# Patient Record
Sex: FEMALE | Race: WHITE | Employment: OTHER | URBAN - METROPOLITAN AREA
[De-identification: names, ages, dates, MRNs, and addresses within clinical notes are randomized per-mention and may not be internally consistent; named-entity substitution may affect disease eponyms.]

---

## 2017-01-03 ENCOUNTER — HOSPITAL ENCOUNTER (OUTPATIENT)
Dept: MAMMOGRAPHY | Facility: CLINIC | Age: 57
Discharge: HOME | End: 2017-01-03
Payer: COMMERCIAL

## 2017-01-03 DIAGNOSIS — Z12.31 OTHER SCREENING MAMMOGRAM: ICD-10-CM

## 2017-01-03 PROCEDURE — G0202 SCR MAMMO BI INCL CAD: HCPCS

## 2017-01-04 ENCOUNTER — ALLSCRIPTS OFFICE VISIT (OUTPATIENT)
Dept: OTHER | Facility: OTHER | Age: 57
End: 2017-01-04

## 2017-04-04 ENCOUNTER — ALLSCRIPTS OFFICE VISIT (OUTPATIENT)
Dept: OTHER | Facility: OTHER | Age: 57
End: 2017-04-04

## 2017-04-05 DIAGNOSIS — F41.1 GENERALIZED ANXIETY DISORDER: ICD-10-CM

## 2017-04-05 DIAGNOSIS — Z78.9 OTHER SPECIFIED HEALTH STATUS: ICD-10-CM

## 2017-04-05 DIAGNOSIS — E78.5 HYPERLIPIDEMIA: ICD-10-CM

## 2017-04-05 DIAGNOSIS — Z78.0 ASYMPTOMATIC MENOPAUSAL STATE: ICD-10-CM

## 2017-04-13 ENCOUNTER — APPOINTMENT (OUTPATIENT)
Dept: LAB | Facility: CLINIC | Age: 57
End: 2017-04-13
Payer: COMMERCIAL

## 2017-04-13 DIAGNOSIS — F41.1 GENERALIZED ANXIETY DISORDER: ICD-10-CM

## 2017-04-13 DIAGNOSIS — Z78.0 ASYMPTOMATIC MENOPAUSAL STATE: ICD-10-CM

## 2017-04-13 DIAGNOSIS — Z78.9 OTHER SPECIFIED HEALTH STATUS: ICD-10-CM

## 2017-04-13 DIAGNOSIS — E78.5 HYPERLIPIDEMIA: ICD-10-CM

## 2017-04-13 LAB
25(OH)D3 SERPL-MCNC: 20.6 NG/ML (ref 30–100)
ALBUMIN SERPL BCP-MCNC: 3.8 G/DL (ref 3.5–5)
ALP SERPL-CCNC: 98 U/L (ref 46–116)
ALT SERPL W P-5'-P-CCNC: 25 U/L (ref 12–78)
ANION GAP SERPL CALCULATED.3IONS-SCNC: 5 MMOL/L (ref 4–13)
AST SERPL W P-5'-P-CCNC: 24 U/L (ref 5–45)
BACTERIA UR QL AUTO: NORMAL /HPF
BASOPHILS # BLD AUTO: 0.03 THOUSANDS/ΜL (ref 0–0.1)
BASOPHILS NFR BLD AUTO: 1 % (ref 0–1)
BILIRUB SERPL-MCNC: 0.32 MG/DL (ref 0.2–1)
BILIRUB UR QL STRIP: NEGATIVE
BUN SERPL-MCNC: 16 MG/DL (ref 5–25)
CALCIUM SERPL-MCNC: 9.5 MG/DL (ref 8.3–10.1)
CHLORIDE SERPL-SCNC: 106 MMOL/L (ref 100–108)
CHOLEST SERPL-MCNC: 204 MG/DL (ref 50–200)
CLARITY UR: ABNORMAL
CO2 SERPL-SCNC: 29 MMOL/L (ref 21–32)
COLOR UR: YELLOW
CREAT SERPL-MCNC: 0.67 MG/DL (ref 0.6–1.3)
EOSINOPHIL # BLD AUTO: 0.09 THOUSAND/ΜL (ref 0–0.61)
EOSINOPHIL NFR BLD AUTO: 2 % (ref 0–6)
ERYTHROCYTE [DISTWIDTH] IN BLOOD BY AUTOMATED COUNT: 13.8 % (ref 11.6–15.1)
GFR SERPL CREATININE-BSD FRML MDRD: >60 ML/MIN/1.73SQ M
GLUCOSE P FAST SERPL-MCNC: 85 MG/DL (ref 65–99)
GLUCOSE UR STRIP-MCNC: NEGATIVE MG/DL
HCT VFR BLD AUTO: 44.6 % (ref 34.8–46.1)
HDLC SERPL-MCNC: 146 MG/DL (ref 40–60)
HGB BLD-MCNC: 14.6 G/DL (ref 11.5–15.4)
HGB UR QL STRIP.AUTO: ABNORMAL
HYALINE CASTS #/AREA URNS LPF: NORMAL /LPF
KETONES UR STRIP-MCNC: NEGATIVE MG/DL
LDLC SERPL CALC-MCNC: 49 MG/DL (ref 0–100)
LDLC SERPL DIRECT ASSAY-MCNC: 35 MG/DL (ref 0–100)
LEUKOCYTE ESTERASE UR QL STRIP: ABNORMAL
LYMPHOCYTES # BLD AUTO: 2.26 THOUSANDS/ΜL (ref 0.6–4.47)
LYMPHOCYTES NFR BLD AUTO: 43 % (ref 14–44)
MCH RBC QN AUTO: 32.2 PG (ref 26.8–34.3)
MCHC RBC AUTO-ENTMCNC: 32.7 G/DL (ref 31.4–37.4)
MCV RBC AUTO: 98 FL (ref 82–98)
MONOCYTES # BLD AUTO: 0.46 THOUSAND/ΜL (ref 0.17–1.22)
MONOCYTES NFR BLD AUTO: 9 % (ref 4–12)
NEUTROPHILS # BLD AUTO: 2.39 THOUSANDS/ΜL (ref 1.85–7.62)
NEUTS SEG NFR BLD AUTO: 45 % (ref 43–75)
NITRITE UR QL STRIP: NEGATIVE
NON-SQ EPI CELLS URNS QL MICRO: NORMAL /HPF
NRBC BLD AUTO-RTO: 0 /100 WBCS
PH UR STRIP.AUTO: 6.5 [PH] (ref 4.5–8)
PLATELET # BLD AUTO: 369 THOUSANDS/UL (ref 149–390)
PMV BLD AUTO: 10.2 FL (ref 8.9–12.7)
POTASSIUM SERPL-SCNC: 4.9 MMOL/L (ref 3.5–5.3)
PROT SERPL-MCNC: 7.5 G/DL (ref 6.4–8.2)
PROT UR STRIP-MCNC: NEGATIVE MG/DL
RBC # BLD AUTO: 4.54 MILLION/UL (ref 3.81–5.12)
RBC #/AREA URNS AUTO: NORMAL /HPF
SODIUM SERPL-SCNC: 140 MMOL/L (ref 136–145)
SP GR UR STRIP.AUTO: 1.01 (ref 1–1.03)
TRIGL SERPL-MCNC: 45 MG/DL
TSH SERPL DL<=0.05 MIU/L-ACNC: 1.29 UIU/ML (ref 0.36–3.74)
UROBILINOGEN UR QL STRIP.AUTO: 0.2 E.U./DL
VIT B12 SERPL-MCNC: 996 PG/ML (ref 100–900)
WBC # BLD AUTO: 5.24 THOUSAND/UL (ref 4.31–10.16)
WBC #/AREA URNS AUTO: NORMAL /HPF

## 2017-04-13 PROCEDURE — 80053 COMPREHEN METABOLIC PANEL: CPT

## 2017-04-13 PROCEDURE — 85025 COMPLETE CBC W/AUTO DIFF WBC: CPT

## 2017-04-13 PROCEDURE — 82607 VITAMIN B-12: CPT

## 2017-04-13 PROCEDURE — 83721 ASSAY OF BLOOD LIPOPROTEIN: CPT

## 2017-04-13 PROCEDURE — 36415 COLL VENOUS BLD VENIPUNCTURE: CPT

## 2017-04-13 PROCEDURE — 87086 URINE CULTURE/COLONY COUNT: CPT

## 2017-04-13 PROCEDURE — 80061 LIPID PANEL: CPT

## 2017-04-13 PROCEDURE — 84590 ASSAY OF VITAMIN A: CPT

## 2017-04-13 PROCEDURE — 84443 ASSAY THYROID STIM HORMONE: CPT

## 2017-04-13 PROCEDURE — 81001 URINALYSIS AUTO W/SCOPE: CPT

## 2017-04-13 PROCEDURE — 84425 ASSAY OF VITAMIN B-1: CPT

## 2017-04-13 PROCEDURE — 82306 VITAMIN D 25 HYDROXY: CPT

## 2017-04-14 LAB — BACTERIA UR CULT: NORMAL

## 2017-04-17 ENCOUNTER — GENERIC CONVERSION - ENCOUNTER (OUTPATIENT)
Dept: OTHER | Facility: OTHER | Age: 57
End: 2017-04-17

## 2017-04-17 LAB
VIT A SERPL-MCNC: 78 UG/DL (ref 20–65)
VIT B1 BLD-SCNC: 132.1 NMOL/L (ref 66.5–200)

## 2018-01-09 NOTE — MISCELLANEOUS
Message   Recorded as Task   Date: 04/17/2017 11:50 AM, Created By: Stephanie Roman   Task Name: Call Back   Assigned To: Boni Quintana   Regarding Patient: Kings Ortiz, Status: Active   Comment:    Stephanie Roman - 17 Apr 2017 11:50 AM     TASK CREATED  Caller: Self; Results Inquiry; [phone] (Home); [phone] (Mobile Phone)  wants results to her labs completed last week      cell phone [phone] - 18 Apr 2017 8:25 AM     TASK EDITED  I spoke with the patient on 04/17/2017 and reviewed her testing  All of her labs were normal except for some mild vitamin D insufficiency  I advised her to start taking over-the-counter vitamin D3 2000 units daily  We will see her back as scheduled  Plan  Vitamin D insufficiency    · Vitamin D3 2000 UNIT Oral Capsule; TAKE 1 CAPSULE BY MOUTH DAILY    Signatures   Electronically signed by : Sunny Lacy DO;  Apr 18 2017  8:27AM EST                       (Author)

## 2018-01-13 VITALS
HEART RATE: 72 BPM | BODY MASS INDEX: 22.48 KG/M2 | TEMPERATURE: 98.3 F | DIASTOLIC BLOOD PRESSURE: 80 MMHG | HEIGHT: 70 IN | SYSTOLIC BLOOD PRESSURE: 122 MMHG | WEIGHT: 157 LBS

## 2018-01-15 VITALS
HEIGHT: 70 IN | DIASTOLIC BLOOD PRESSURE: 84 MMHG | SYSTOLIC BLOOD PRESSURE: 132 MMHG | WEIGHT: 158 LBS | BODY MASS INDEX: 22.62 KG/M2 | HEART RATE: 72 BPM

## 2018-03-06 ENCOUNTER — OFFICE VISIT (OUTPATIENT)
Dept: FAMILY MEDICINE CLINIC | Facility: CLINIC | Age: 58
End: 2018-03-06
Payer: COMMERCIAL

## 2018-03-06 VITALS
SYSTOLIC BLOOD PRESSURE: 120 MMHG | BODY MASS INDEX: 22.9 KG/M2 | OXYGEN SATURATION: 98 % | HEIGHT: 70 IN | WEIGHT: 160 LBS | HEART RATE: 65 BPM | DIASTOLIC BLOOD PRESSURE: 70 MMHG

## 2018-03-06 DIAGNOSIS — H61.21 IMPACTED CERUMEN OF RIGHT EAR: Primary | ICD-10-CM

## 2018-03-06 PROBLEM — E55.9 VITAMIN D INSUFFICIENCY: Status: ACTIVE | Noted: 2017-04-18

## 2018-03-06 PROBLEM — F41.1 ANXIETY, GENERALIZED: Status: ACTIVE | Noted: 2017-04-04

## 2018-03-06 PROCEDURE — 69209 REMOVE IMPACTED EAR WAX UNI: CPT | Performed by: NURSE PRACTITIONER

## 2018-03-06 PROCEDURE — 99213 OFFICE O/P EST LOW 20 MIN: CPT | Performed by: NURSE PRACTITIONER

## 2018-03-06 RX ORDER — ACETAMINOPHEN 160 MG
1 TABLET,DISINTEGRATING ORAL DAILY
COMMUNITY
Start: 2017-04-18 | End: 2018-09-17

## 2018-03-06 NOTE — PROGRESS NOTES
Assessment/Plan   Diagnoses and all orders for this visit:    Impacted cerumen of right ear  -     neomycin-polymyxin-hydrocortisone (CORTISPORIN) otic solution; Administer 4 drops into both ears every 6 (six) hours    Other orders  -     Cholecalciferol (VITAMIN D3) 2000 units capsule; Take 1 capsule by mouth daily        Chief Complaint   Patient presents with    Ear ringing     in both ears for 2 weeks       Subjective   Patient ID: Mili Gupta is a 62 y o  female  Vitals:    03/06/18 1157   BP: 120/70   Pulse: 65   SpO2: 98%     Ear Fullness    There is pain in both ears  This is a new problem  The current episode started 1 to 4 weeks ago (3-4 weeks ago noticed ringing, "chirping" in my ears")  The problem occurs constantly  The problem has been unchanged  There has been no fever  The pain is at a severity of 0/10  The patient is experiencing no pain  Pertinent negatives include no abdominal pain, coughing, diarrhea, ear discharge, headaches, hearing loss, neck pain, rash, rhinorrhea, sore throat or vomiting  She has tried nothing for the symptoms  The treatment provided no relief  There is no history of a chronic ear infection, hearing loss or a tympanostomy tube  The following portions of the patient's history were reviewed and updated as appropriate: allergies, current medications, past family history, past medical history, past surgical history and problem list     Review of Systems   Constitutional: Negative  HENT: Positive for tinnitus  Negative for ear discharge, hearing loss, rhinorrhea and sore throat  Eyes: Negative  Respiratory: Negative  Negative for cough  Cardiovascular: Negative  Gastrointestinal: Negative  Negative for abdominal pain, diarrhea and vomiting  Endocrine: Negative  Genitourinary: Negative  Musculoskeletal: Negative  Negative for neck pain  Skin: Negative for rash  Neurological: Negative  Negative for headaches  Hematological: Negative  Psychiatric/Behavioral: Negative  Objective     Physical Exam   Constitutional: She is oriented to person, place, and time  She appears well-nourished  No distress  HENT:   Head: Normocephalic  Right Ear: A foreign body (hard, black impacted cerumen in bilateralears  ) is present  No mastoid tenderness  Left Ear: A foreign body is present  No mastoid tenderness  Tympanic membrane is not injected, not erythematous and not bulging  Neurological: She is alert and oriented to person, place, and time  Skin: Skin is warm and dry  She is not diaphoretic  Psychiatric: She has a normal mood and affect  Her behavior is normal  Judgment and thought content normal    Procedure: Left ear cerumen removed with lighted curet without difficulty although cerumen was hard and scratched the lower ear canal on removal  States the "chiping had stopped" in her left ear  The right ear however was increased more difficult, irrigation was attempted, lighted curet removal was attempted and unable to remove without causing discomfort for the patient  Plan:  To use ear drops in both ears,  Left ear for irritation in the ear post obstruction, and the right ear to soften the cerumen for removal  Will return in 5-6 days for another attempt of removal

## 2018-03-12 ENCOUNTER — OFFICE VISIT (OUTPATIENT)
Dept: FAMILY MEDICINE CLINIC | Facility: CLINIC | Age: 58
End: 2018-03-12
Payer: COMMERCIAL

## 2018-03-12 VITALS
HEIGHT: 70 IN | HEART RATE: 78 BPM | SYSTOLIC BLOOD PRESSURE: 126 MMHG | DIASTOLIC BLOOD PRESSURE: 70 MMHG | WEIGHT: 161 LBS | BODY MASS INDEX: 23.05 KG/M2

## 2018-03-12 DIAGNOSIS — H61.21 IMPACTED CERUMEN OF RIGHT EAR: ICD-10-CM

## 2018-03-12 PROCEDURE — 69209 REMOVE IMPACTED EAR WAX UNI: CPT | Performed by: NURSE PRACTITIONER

## 2018-03-12 PROCEDURE — 99212 OFFICE O/P EST SF 10 MIN: CPT | Performed by: NURSE PRACTITIONER

## 2018-03-12 NOTE — PROGRESS NOTES
Assessment/Plan   Diagnoses and all orders for this visit:    Impacted cerumen of right ear  -     neomycin-polymyxin-hydrocortisone (CORTISPORIN) otic solution; Administer 4 drops into both ears every 6 (six) hours        Chief Complaint   Patient presents with    Earache     Followup from ear lavage - her left ear is feeling fine but her right ear continues to "buzz"  Subjective   Patient ID: Harry Decker is a 62 y o  female  Vitals:    03/12/18 0928   BP: 126/70   Pulse: 78     Earache    There is pain in the right (impacted cerumen in right canal, has been using mineral oil and otic ear drops to soften) ear  This is a recurrent problem  The current episode started more than 1 month ago  Episode frequency: "not really pain" "chirping in my right ear" , left ear chirping has resolved post removal of wax last week  The problem has been unchanged  There has been no fever  The pain is at a severity of 0/10  The patient is experiencing no pain  Associated symptoms include ear discharge (using ear drops) and hearing loss  Pertinent negatives include no abdominal pain, coughing, diarrhea, headaches, neck pain, rash, rhinorrhea, sore throat or vomiting  She has tried antibiotics and ear drops for the symptoms  The treatment provided no relief  There is no history of a chronic ear infection, hearing loss or a tympanostomy tube  The following portions of the patient's history were reviewed and updated as appropriate: allergies, current medications, past medical history, past social history, past surgical history and problem list     Review of Systems   Constitutional: Negative  HENT: Positive for ear discharge (using ear drops), ear pain and hearing loss  Negative for rhinorrhea and sore throat  Eyes: Negative  Respiratory: Negative  Negative for cough  Cardiovascular: Negative  Gastrointestinal: Negative  Negative for abdominal pain, diarrhea and vomiting  Endocrine: Negative  Genitourinary: Negative  Musculoskeletal: Negative  Negative for neck pain  Skin: Negative  Negative for rash  Allergic/Immunologic: Negative  Neurological: Negative  Negative for headaches  Hematological: Negative  Psychiatric/Behavioral: Negative  Objective     Physical Exam   Constitutional: She appears well-nourished  No distress  HENT:   Head: Normocephalic  Right Ear: External ear normal  No tenderness  A foreign body (cerumen remains impacted) is present  No mastoid tenderness  Tympanic membrane is not erythematous (post removal, ear canal with redness and slight swelling)  No middle ear effusion  Left Ear: Hearing, tympanic membrane, external ear and ear canal normal  No tenderness  No mastoid tenderness  No middle ear effusion  Mouth/Throat: Uvula is midline, oropharynx is clear and moist and mucous membranes are normal    Procedure: Right ear canal initially blocked with cerumen, irrigated with warm water and cerumen was removed without difficulty  Tolerated the procedure without pain or discomfort   Denies dizziness

## 2018-03-12 NOTE — PATIENT INSTRUCTIONS
Otitis Externa   WHAT YOU NEED TO KNOW:   Otitis externa, or swimmer's ear, is an infection in the outer ear canal  This canal goes from the outside of the ear to the eardrum  DISCHARGE INSTRUCTIONS:   Return to the emergency department if:   · You have severe ear pain  · You are suddenly unable to hear at all  · You have new swelling in your face, behind your ears, or in your neck  · You suddenly cannot move part of your face  · Your face suddenly feels numb  Contact your healthcare provider if:   · You have a fever  · Your signs and symptoms do not get better after 2 days of treatment  · Your signs and symptoms go away for a time, but then come back  · You have questions or concerns about your condition or care  Medicines:   · NSAIDs , such as ibuprofen, help decrease swelling, pain, and fever  This medicine is available with or without a doctor's order  NSAIDs can cause stomach bleeding or kidney problems in certain people  If you take blood thinner medicine, always ask if NSAIDs are safe for you  Always read the medicine label and follow directions  Do not give these medicines to children under 10months of age without direction from your child's healthcare provider  · Acetaminophen  decreases pain and fever  It is available without a doctor's order  Ask how much to take and how often to take it  Follow directions  Acetaminophen can cause liver damage if not taken correctly  · Ear drops  that contain an antibiotic may be given  The antibiotic helps treat a bacterial infection  You may also be given steroid medicine  The steroid helps decrease redness, swelling, and pain  · Take your medicine as directed  Contact your healthcare provider if you think your medicine is not helping or if you have side effects  Tell him or her if you are allergic to any medicine  Keep a list of the medicines, vitamins, and herbs you take  Include the amounts, and when and why you take them  Bring the list or the pill bottles to follow-up visits  Carry your medicine list with you in case of an emergency  Follow up with your healthcare provider as directed:  Write down your questions so you remember to ask them during your visits  How to use eardrops:   · Lie down on your side with your infected ear facing up  · Carefully drip the correct number of eardrops into your ear  Have another person help you if possible  · Gently move the outside part of your ear back and forth to help the medicine reach your ear canal      · Stay lying down in the same position (with your ear facing up) for 3 to 5 minutes  Prevent otitis externa:   · Do not put cotton swabs or foreign objects in your ears  · Wrap a clean moist washcloth around your finger, and use it to clean your outer ear and remove extra ear wax  · Use ear plugs when you swim  Dry your outer ears completely after you swim or bathe  © 2017 [employer]  Information is for End User's use only and may not be sold, redistributed or otherwise used for commercial purposes  All illustrations and images included in CareNotes® are the copyrighted property of [employer]  or Garth Wilkinson  The above information is an  only  It is not intended as medical advice for individual conditions or treatments  Talk to your doctor, nurse or pharmacist before following any medical regimen to see if it is safe and effective for you

## 2018-04-11 ENCOUNTER — TRANSCRIBE ORDERS (OUTPATIENT)
Dept: ADMINISTRATIVE | Facility: HOSPITAL | Age: 58
End: 2018-04-11

## 2018-04-11 DIAGNOSIS — Z12.39 BREAST SCREENING: Primary | ICD-10-CM

## 2018-04-19 ENCOUNTER — HOSPITAL ENCOUNTER (OUTPATIENT)
Dept: MAMMOGRAPHY | Facility: CLINIC | Age: 58
Discharge: HOME | End: 2018-04-19
Payer: COMMERCIAL

## 2018-04-19 DIAGNOSIS — Z12.39 BREAST SCREENING: ICD-10-CM

## 2018-04-19 PROCEDURE — 77067 SCR MAMMO BI INCL CAD: CPT

## 2018-09-17 ENCOUNTER — OFFICE VISIT (OUTPATIENT)
Dept: FAMILY MEDICINE CLINIC | Facility: CLINIC | Age: 58
End: 2018-09-17
Payer: COMMERCIAL

## 2018-09-17 VITALS
TEMPERATURE: 97.4 F | RESPIRATION RATE: 13 BRPM | WEIGHT: 158 LBS | HEIGHT: 70 IN | BODY MASS INDEX: 22.62 KG/M2 | OXYGEN SATURATION: 96 % | DIASTOLIC BLOOD PRESSURE: 76 MMHG | HEART RATE: 70 BPM | SYSTOLIC BLOOD PRESSURE: 124 MMHG

## 2018-09-17 DIAGNOSIS — Z13.6 SCREENING FOR CARDIOVASCULAR CONDITION: ICD-10-CM

## 2018-09-17 DIAGNOSIS — Z00.00 HEALTH MAINTENANCE EXAMINATION: Primary | ICD-10-CM

## 2018-09-17 DIAGNOSIS — Z13.1 SCREENING FOR DIABETES MELLITUS: ICD-10-CM

## 2018-09-17 DIAGNOSIS — Z78.9 VEGETARIAN DIET: ICD-10-CM

## 2018-09-17 PROCEDURE — 99396 PREV VISIT EST AGE 40-64: CPT | Performed by: FAMILY MEDICINE

## 2018-09-17 NOTE — PATIENT INSTRUCTIONS
I would recommend the Tdap- tetanus vaccine, Flu Vaccine,  and check with insurance about Shingrix- shingles vaccine

## 2018-09-17 NOTE — PROGRESS NOTES
Chief Complaint   Patient presents with    Physical Exam     62years old       Subjective     Lino Lee is a 62 y o   female and is here for routine health maintenance  The patient reports fluttering in her chest and occasional chest tightness  Leanne Nazario History of Present Illness     Lino Lee is a 62 y o  female who presents today for complete physical  She has been feeling well and has no complaints today  The patient denies any chest pain, or shortness of breath  She is having some palpitations-fluttering in her chest on and off over the past month  There was a history of MVP in the past   There is no relation to activity usually at rest   There is no edema  There are no headaches or visual changes  There is no lightheadedness, dizziness, or fainting spells  It will last less than a minute  There is no chest pain with exertion  There are no GI symptoms  The patient sees her eye doctor for her contacts  The patient is watching her diet and follows a regular exercise program    She sees the GYN regularly and her dermatologist   She needs to see the dentist     She has recently retired  There is no shortness of breath  There are no GI problems  Well Adult Physical   Patient here for a comprehensive physical exam     Diet and Physical Activity  Diet: well balanced diet, low fat diet and vegetarian diet  She is watching her diet  Weight concerns: None, patient's BMI is between 18 5-24 9  Exercise: daily      Depression Screen  PHQ-9 Depression Screening    PHQ-9:    Frequency of the following problems over the past two weeks:       Little interest or pleasure in doing things:  0 - not at all  Feeling down, depressed, or hopeless:  0 - not at all  PHQ-2 Score:  0          General Health  Hearing: Normal:  Decreased bilaterally- sees ENT      Vision: no vision problems, goes for regular eye exams, most recent eye exam <1 year and wears contacts  Dental: no dental visits for >1 year     History:  LMP: No LMP recorded (lmp unknown)  Patient is postmenopausal       Cancer Screening  Colononoscopy 4/03/2012  Mammogram 4/19/2018   Pap sees GYN  Abnormal pap? no  Smoker former- quit 35 years ago  The following portions of the patient's history were reviewed and updated as appropriate: allergies, current medications, past family history, past medical history, past social history, past surgical history and problem list     Review of Systems     Review of Systems   Constitutional: Negative  HENT: Negative  Eyes: Negative  Respiratory: Negative  Cardiovascular: Negative  Gastrointestinal: Negative  Endocrine: Negative  Genitourinary: Negative  Musculoskeletal: Negative  Skin: Negative  Allergic/Immunologic: Negative  Neurological: Negative  Hematological: Negative  Psychiatric/Behavioral: Negative          Past Medical History     Past Medical History:   Diagnosis Date    Asymptomatic bacteriuria     Last Assessed: 4/8/2013     Contusion of left chest wall     Last Assessed: 10/20/2015     Elevated ALT measurement     Last Assessed: 4/8/2013    Irritable bowel syndrome     Right lateral epicondylitis     Last Assessed: 11/8/2013        Past Surgical History     Past Surgical History:   Procedure Laterality Date    NO PAST SURGERIES         Social History     Social History     Social History    Marital status: /Civil Union     Spouse name: N/A    Number of children: N/A    Years of education: N/A     Social History Main Topics    Smoking status: Former Smoker    Smokeless tobacco: Never Used      Comment: Per [employer]: Former Smoker     Alcohol use 4 2 oz/week     7 Glasses of wine per week      Comment: social, Occasional Alcohol Use      Drug use: No    Sexual activity: Not Asked     Other Topics Concern    None     Social History Narrative    Consumes a vegan diet         Retired       Family History     Family History Problem Relation Age of Onset    Fibromyalgia Mother        Current Medications       Current Outpatient Prescriptions:     Cyanocobalamin (VITAMIN B12 PO), Take by mouth, Disp: , Rfl:      Allergies     Allergies   Allergen Reactions    Naproxen      Annotation - [id number]: The patient felt disoriented    Sulfa Antibiotics Rash       Objective     /76 (BP Location: Right arm, Patient Position: Sitting, Cuff Size: Standard)   Pulse 70   Temp (!) 97 4 °F (36 3 °C) (Tympanic)   Resp 13   Ht 5' 10" (1 778 m)   Wt 71 7 kg (158 lb)   LMP  (LMP Unknown)   SpO2 96%   BMI 22 67 kg/m²   Wt Readings from Last 3 Encounters:   09/17/18 71 7 kg (158 lb)   03/12/18 73 kg (161 lb)   03/06/18 72 6 kg (160 lb)     BP Readings from Last 3 Encounters:   09/17/18 124/76   03/12/18 126/70   03/06/18 120/70     Pulse Readings from Last 3 Encounters:   09/17/18 70   03/12/18 78   03/06/18 65     Body mass index is 22 67 kg/m²  Physical Exam   Constitutional: She is oriented to person, place, and time  She appears well-developed and well-nourished  HENT:   Head: Normocephalic and atraumatic  Mouth/Throat: No oropharyngeal exudate  Eyes: Conjunctivae and EOM are normal  Pupils are equal, round, and reactive to light  Neck: Normal range of motion  No JVD present  No tracheal deviation present  No thyromegaly present  Cardiovascular: Normal rate, regular rhythm, normal heart sounds and intact distal pulses  Exam reveals no gallop and no friction rub  No murmur heard  Pulmonary/Chest: Effort normal and breath sounds normal  No stridor  No respiratory distress  She has no wheezes  She has no rales  She exhibits no tenderness  Abdominal: Soft  Bowel sounds are normal  She exhibits no distension and no mass  There is no tenderness  There is no rebound and no guarding  Musculoskeletal: Normal range of motion  She exhibits no edema, tenderness or deformity     Lymphadenopathy:     She has no cervical adenopathy  Neurological: She is alert and oriented to person, place, and time  She has normal reflexes  No cranial nerve deficit  She exhibits normal muscle tone  Coordination normal    Skin: Skin is warm and dry  Visual Acuity Screening    Right eye Left eye Both eyes   Without correction:      With correction: 20/40 20/200 20/30   Comments: Wears contacts, right eye for distance, left for readers- monovision  Health Maintenance     Health Maintenance   Topic Date Due    INFLUENZA VACCINE  09/01/2018    MAMMOGRAM  04/19/2019    Depression Screening PHQ  09/17/2019    DTaP,Tdap,and Td Vaccines (2 - Td) 11/09/2019    CRC Screening: Colonoscopy  04/03/2022     Immunization History   Administered Date(s) Administered    Tdap 11/09/2009         Laboratory Results:     Lab Results   Component Value Date    BUN 16 04/13/2017    BUN 17 07/17/2014     Lab Results   Component Value Date    GLUCOSE 94 07/17/2014    ALT 25 04/13/2017    ALT 33 07/17/2014    AST 24 04/13/2017    AST 25 07/17/2014       Lipid Profile:   No results found for: CHOL  Lab Results   Component Value Date     (H) 04/13/2017     Lab Results   Component Value Date    LDLCALC 49 04/13/2017     Lab Results   Component Value Date    TRIG 45 04/13/2017       No visits with results within 2 Month(s) from this visit     Latest known visit with results is:   Appointment on 04/13/2017   Component Date Value    WBC 04/13/2017 5 24     RBC 04/13/2017 4 54     Hemoglobin 04/13/2017 14 6     Hematocrit 04/13/2017 44 6     MCV 04/13/2017 98     MCH 04/13/2017 32 2     MCHC 04/13/2017 32 7     RDW 04/13/2017 13 8     MPV 04/13/2017 10 2     Platelets 00/95/8275 369     nRBC 04/13/2017 0     Neutrophils Relative 04/13/2017 45     Lymphocytes Relative 04/13/2017 43     Monocytes Relative 04/13/2017 9     Eosinophils Relative 04/13/2017 2     Basophils Relative 04/13/2017 1     Neutrophils Absolute 04/13/2017 2 39     Lymphocytes Absolute 04/13/2017 2 26     Monocytes Absolute 04/13/2017 0 46     Eosinophils Absolute 04/13/2017 0 09     Basophils Absolute 04/13/2017 0 03     Sodium 04/13/2017 140     Potassium 04/13/2017 4 9     Chloride 04/13/2017 106     CO2 04/13/2017 29     ANION GAP 04/13/2017 5     BUN 04/13/2017 16     Creatinine 04/13/2017 0 67     Glucose, Fasting 04/13/2017 85     Calcium 04/13/2017 9 5     AST 04/13/2017 24     ALT 04/13/2017 25     Alkaline Phosphatase 04/13/2017 98     Total Protein 04/13/2017 7 5     Albumin 04/13/2017 3 8     Total Bilirubin 04/13/2017 0 32     eGFR 04/13/2017 >60 0     Cholesterol 04/13/2017 204*    Triglycerides 04/13/2017 45     HDL, Direct 04/13/2017 146*    LDL Calculated 04/13/2017 49     TSH 3RD GENERATON 04/13/2017 1 290     Color, UA 04/13/2017 Yellow     Clarity, UA 04/13/2017 Turbid     Specific Gravity, UA 04/13/2017 1 007     pH, UA 04/13/2017 6 5     Leukocytes, UA 04/13/2017 Trace*    Nitrite, UA 04/13/2017 Negative     Protein, UA 04/13/2017 Negative     Glucose, UA 04/13/2017 Negative     Ketones, UA 04/13/2017 Negative     Urobilinogen, UA 04/13/2017 0 2     Bilirubin, UA 04/13/2017 Negative     Blood, UA 04/13/2017 Trace*    LDL Direct 04/13/2017 35     Vitamin B-12 04/13/2017 996*    Vit D, 25-Hydroxy 04/13/2017 20 6*    Vitamin B1, Whole Blood 04/13/2017 132 1     Vitamin A 04/13/2017 78*    RBC, UA 04/13/2017 None Seen     WBC, UA 04/13/2017 None Seen     Epithelial Cells 04/13/2017 None Seen     Bacteria, UA 04/13/2017 None Seen     Hyaline Casts, UA 04/13/2017 None Seen     Urine Culture 04/13/2017 <10,000 cfu/ml Mixed Contaminants X2                   Assessment/Plan         1   Healthy female exam   2  Patient Counseling:   · Nutrition: Stressed importance of a well balanced diet, moderation of sodium/saturated fat, caloric balance and sufficient intake of fiber  · Exercise: Stressed the importance of regular exercise with a goal of 150 minutes per week  · Dental Health: Discussed daily flossing and brushing and regular dental visits   · Injury Prevention: Discussed Safety Belts, Safety Helmets, and Smoke Detectors    · Immunizations reviewed  The patient will follow up at a later date for a Tdap, flu shot, and possibly Shingrix when her insurance is reactivated  · Discussed benefits of screening the patient is current with all of her routine screenings  · Discussed the patient's BMI with her  The BMI is in the acceptable range  3  Cancer Screening patient is current with all of her routine screenings  4  Labs-the patient was given orders for routine lab work  We will follow up with the results when available  5  The patient is having intermittent fluttering in her chest that is very rare  She has no other symptoms with this and is occurring mainly rest   I suspect he may be related to PVCs  Patient states symptoms are very mild  We will check the lab work 1st to see if there is underlying metabolic cause but she will follow up in the office if the symptoms would him more persistent or if they worsen  6  Follow up next physical in 1 year  Health maintenance-the patient had a normal exam today in the office  She will continue with her healthy diet and exercise  We will follow up with results of the lab work when available  We will see her back as scheduled    Irish Rivas DO

## 2018-09-24 ENCOUNTER — OFFICE VISIT (OUTPATIENT)
Dept: FAMILY MEDICINE CLINIC | Facility: CLINIC | Age: 58
End: 2018-09-24
Payer: COMMERCIAL

## 2018-09-24 VITALS
TEMPERATURE: 97.2 F | BODY MASS INDEX: 22.62 KG/M2 | DIASTOLIC BLOOD PRESSURE: 80 MMHG | OXYGEN SATURATION: 98 % | HEIGHT: 70 IN | HEART RATE: 110 BPM | RESPIRATION RATE: 14 BRPM | WEIGHT: 158 LBS | SYSTOLIC BLOOD PRESSURE: 126 MMHG

## 2018-09-24 DIAGNOSIS — E53.8 VITAMIN B 12 DEFICIENCY: ICD-10-CM

## 2018-09-24 DIAGNOSIS — R53.83 FATIGUE, UNSPECIFIED TYPE: ICD-10-CM

## 2018-09-24 DIAGNOSIS — R21 RASH: Primary | ICD-10-CM

## 2018-09-24 PROCEDURE — 99214 OFFICE O/P EST MOD 30 MIN: CPT | Performed by: FAMILY MEDICINE

## 2018-09-24 PROCEDURE — 3008F BODY MASS INDEX DOCD: CPT | Performed by: FAMILY MEDICINE

## 2018-09-24 NOTE — PROGRESS NOTES
Assessment/Plan:     Diagnoses and all orders for this visit:    Rash    Vitamin B 12 deficiency    Fatigue, unspecified type  -     Lyme Antibody Profile with reflex to WB; Future  -     Sedimentation rate, automated; Future  -     Lyme Antibody Profile with reflex to WB      will order a Lyme titer and sed rate  Rashes consistent with contact dermatitis and should hopefully continue to fade out as patient states is getting better  Lasting discussed was the B12  Labs have already been ordered that she has not gotten yet containing a B12 level known line will analyze these labs to see if she needs to continue taking any B12 supplement  She should follow up in 2-3 weeks      Subjective:     Chief Complaint   Patient presents with    Rash     begining 1 week ago  on arms and torso         Patient ID: Kalpesh Ashley is a 62 y o  female  Patient is here today for rash on her wrist that now has spread to different parts of her body  She is concerned about Lyme disease  We also discussed a potential B12 deficiency as she was having neurological symptoms  She started to be 12 with this when the symptoms will started  She states she had a reaction B12 in the past        The following portions of the patient's history were reviewed and updated as appropriate: allergies, current medications, past family history, past medical history, past social history, past surgical history and problem list     Review of Systems   Constitutional: Negative  HENT: Negative  Eyes: Negative  Respiratory: Negative  Cardiovascular: Negative  Gastrointestinal: Negative  Endocrine: Negative  Genitourinary: Negative  Musculoskeletal: Negative  Skin: Positive for rash  Allergic/Immunologic: Negative  Neurological: Negative  Hematological: Negative  Psychiatric/Behavioral: Negative  All other systems reviewed and are negative          Objective:    Vitals:    09/24/18 0938   BP: 126/80   BP Location: Right arm   Patient Position: Sitting   Cuff Size: Standard   Pulse: (!) 110   Resp: 14   Temp: (!) 97 2 °F (36 2 °C)   TempSrc: Tympanic   SpO2: 98%   Weight: 71 7 kg (158 lb)   Height: 5' 10" (1 778 m)          Physical Exam   Constitutional: She is oriented to person, place, and time  She appears well-developed and well-nourished  HENT:   Head: Normocephalic and atraumatic  Right Ear: External ear normal    Left Ear: External ear normal    Mouth/Throat: Oropharynx is clear and moist    Eyes: Conjunctivae and EOM are normal  Pupils are equal, round, and reactive to light  Neck: Normal range of motion  Cardiovascular: Normal rate, regular rhythm and normal heart sounds  Pulmonary/Chest: Effort normal and breath sounds normal    Abdominal: Soft  Bowel sounds are normal    Musculoskeletal: Normal range of motion  Neurological: She is alert and oriented to person, place, and time  She has normal reflexes  Skin: Skin is warm and dry  Rash on arms consistent with contact dermatitis   Psychiatric: She has a normal mood and affect  Her behavior is normal  Judgment and thought content normal    Nursing note and vitals reviewed

## 2018-09-27 ENCOUNTER — TELEPHONE (OUTPATIENT)
Dept: FAMILY MEDICINE CLINIC | Facility: CLINIC | Age: 58
End: 2018-09-27

## 2018-09-27 DIAGNOSIS — A69.20 LYME DISEASE: Primary | ICD-10-CM

## 2018-09-27 DIAGNOSIS — R31.21 ASYMPTOMATIC MICROSCOPIC HEMATURIA: Primary | ICD-10-CM

## 2018-09-27 LAB
B BURGDOR AB SER QL IA: >12 INDEX
B BURGDOR IGG SER QL IB: NEGATIVE
B BURGDOR IGM SER QL IB: POSITIVE
B BURGDOR18KD IGG SER QL IB: ABNORMAL
B BURGDOR23KD IGG SER QL IB: ABNORMAL
B BURGDOR23KD IGM SER QL IB: REACTIVE
B BURGDOR28KD IGG SER QL IB: ABNORMAL
B BURGDOR30KD IGG SER QL IB: ABNORMAL
B BURGDOR39KD IGG SER QL IB: ABNORMAL
B BURGDOR39KD IGM SER QL IB: ABNORMAL
B BURGDOR41KD IGG SER QL IB: REACTIVE
B BURGDOR41KD IGM SER QL IB: REACTIVE
B BURGDOR45KD IGG SER QL IB: ABNORMAL
B BURGDOR58KD IGG SER QL IB: ABNORMAL
B BURGDOR66KD IGG SER QL IB: ABNORMAL
B BURGDOR93KD IGG SER QL IB: ABNORMAL

## 2018-09-27 RX ORDER — DOXYCYCLINE 100 MG/1
100 TABLET ORAL 2 TIMES DAILY
Qty: 42 TABLET | Refills: 0 | Status: SHIPPED | OUTPATIENT
Start: 2018-09-27 | End: 2018-10-18

## 2018-09-27 NOTE — TELEPHONE ENCOUNTER
PT HAD BW 09/25/18 [facility] FROM DR Waldemar Tripathi AND DR ALEXANDRE THE [facility]=ME DAY FOR DIFFERENT THINGS  AND WANTS THE RESULTS AND COPIES CALL PT  [phone]

## 2018-09-28 LAB
25(OH)D3 SERPL-MCNC: 29 NG/ML (ref 30–100)
ALBUMIN SERPL-MCNC: 4.2 G/DL (ref 3.6–5.1)
ALBUMIN/GLOB SERPL: 1.5 (CALC) (ref 1–2.5)
ALP SERPL-CCNC: 101 U/L (ref 33–130)
ALT SERPL-CCNC: 15 U/L (ref 6–29)
APPEARANCE UR: CLEAR
AST SERPL-CCNC: 19 U/L (ref 10–35)
BACTERIA UR QL AUTO: ABNORMAL /HPF
BASOPHILS # BLD AUTO: 53 CELLS/UL (ref 0–200)
BASOPHILS NFR BLD AUTO: 0.7 %
BILIRUB SERPL-MCNC: 0.5 MG/DL (ref 0.2–1.2)
BILIRUB UR QL STRIP: NEGATIVE
BUN SERPL-MCNC: 11 MG/DL (ref 7–25)
BUN/CREAT SERPL: NORMAL (CALC) (ref 6–22)
CALCIUM SERPL-MCNC: 9.9 MG/DL (ref 8.6–10.4)
CHLORIDE SERPL-SCNC: 104 MMOL/L (ref 98–110)
CHOLEST SERPL-MCNC: 169 MG/DL
CHOLEST/HDLC SERPL: 1.5 (CALC)
CO2 SERPL-SCNC: 31 MMOL/L (ref 20–32)
COLOR UR: YELLOW
CREAT SERPL-MCNC: 0.63 MG/DL (ref 0.5–1.05)
EOSINOPHIL # BLD AUTO: 240 CELLS/UL (ref 15–500)
EOSINOPHIL NFR BLD AUTO: 3.2 %
ERYTHROCYTE [DISTWIDTH] IN BLOOD BY AUTOMATED COUNT: 12.6 % (ref 11–15)
FERRITIN SERPL-MCNC: 89 NG/ML (ref 10–232)
FOLATE SERPL-MCNC: 22.5 NG/ML
GLOBULIN SER CALC-MCNC: 2.8 G/DL (CALC) (ref 1.9–3.7)
GLUCOSE SERPL-MCNC: 94 MG/DL (ref 65–99)
GLUCOSE UR QL STRIP: NEGATIVE
HCT VFR BLD AUTO: 42.8 % (ref 35–45)
HDLC SERPL-MCNC: 115 MG/DL
HGB BLD-MCNC: 14.4 G/DL (ref 11.7–15.5)
HGB UR QL STRIP: ABNORMAL
HYALINE CASTS #/AREA URNS LPF: ABNORMAL /LPF
KETONES UR QL STRIP: NEGATIVE
LDLC SERPL CALC-MCNC: 42 MG/DL (CALC)
LEUKOCYTE ESTERASE UR QL STRIP: ABNORMAL
LYMPHOCYTES # BLD AUTO: 2183 CELLS/UL (ref 850–3900)
LYMPHOCYTES NFR BLD AUTO: 29.1 %
MCH RBC QN AUTO: 32.1 PG (ref 27–33)
MCHC RBC AUTO-ENTMCNC: 33.6 G/DL (ref 32–36)
MCV RBC AUTO: 95.3 FL (ref 80–100)
MONOCYTES # BLD AUTO: 570 CELLS/UL (ref 200–950)
MONOCYTES NFR BLD AUTO: 7.6 %
NEUTROPHILS # BLD AUTO: 4455 CELLS/UL (ref 1500–7800)
NEUTROPHILS NFR BLD AUTO: 59.4 %
NITRITE UR QL STRIP: NEGATIVE
NONHDLC SERPL-MCNC: 54 MG/DL (CALC)
PH UR STRIP: 7 [PH] (ref 5–8)
PLATELET # BLD AUTO: 458 THOUSAND/UL (ref 140–400)
PMV BLD REES-ECKER: 9.5 FL (ref 7.5–12.5)
POTASSIUM SERPL-SCNC: 4.5 MMOL/L (ref 3.5–5.3)
PROT SERPL-MCNC: 7 G/DL (ref 6.1–8.1)
PROT UR QL STRIP: NEGATIVE
RBC # BLD AUTO: 4.49 MILLION/UL (ref 3.8–5.1)
RBC #/AREA URNS HPF: ABNORMAL /HPF
SL AMB EGFR AFRICAN AMERICAN: 115 ML/MIN/1.73M2
SL AMB EGFR NON AFRICAN AMERICAN: 99 ML/MIN/1.73M2
SODIUM SERPL-SCNC: 141 MMOL/L (ref 135–146)
SP GR UR STRIP: 1.01 (ref 1–1.03)
SQUAMOUS #/AREA URNS HPF: ABNORMAL /HPF
TRIGL SERPL-MCNC: 43 MG/DL
TSH SERPL-ACNC: 1.99 MIU/L (ref 0.4–4.5)
VIT B12 SERPL-MCNC: 870 PG/ML (ref 200–1100)
WBC # BLD AUTO: 7.5 THOUSAND/UL (ref 3.8–10.8)
WBC #/AREA URNS HPF: ABNORMAL /HPF

## 2018-09-28 NOTE — TELEPHONE ENCOUNTER
I spoke with the patient on 9/27/2018 at 7:20 p m  and reviewed the results of her testing  She was already made aware of her positive Lyme titer and has just  the prescription for the doxycycline for 21 days  In regards to rest of her blood work everything looked normal except for a mildly low vitamin-D level  I advised her to start taking vitamin D3 1000 units daily to boost her count  In addition, she did have some evidence of microscopic hematuria  I will order a UA CNS and urine cytology to further evaluate this  We will follow up with the results

## 2018-09-29 LAB
APPEARANCE UR: CLEAR
BACTERIA UR CULT: NORMAL
BACTERIA UR QL AUTO: NORMAL /HPF
BILIRUB UR QL STRIP: NEGATIVE
COLOR UR: YELLOW
GLUCOSE UR QL STRIP: NEGATIVE
HGB UR QL STRIP: NEGATIVE
HYALINE CASTS #/AREA URNS LPF: NORMAL /LPF
KETONES UR QL STRIP: NEGATIVE
LEUKOCYTE ESTERASE UR QL STRIP: NEGATIVE
NITRITE UR QL STRIP: NEGATIVE
PH UR STRIP: 7.5 [PH] (ref 5–8)
PROT UR QL STRIP: NEGATIVE
RBC #/AREA URNS HPF: NORMAL /HPF
SP GR UR STRIP: 1 (ref 1–1.03)
SQUAMOUS #/AREA URNS HPF: NORMAL /HPF
WBC #/AREA URNS HPF: NORMAL /HPF

## 2018-10-02 ENCOUNTER — TELEPHONE (OUTPATIENT)
Dept: FAMILY MEDICINE CLINIC | Facility: CLINIC | Age: 58
End: 2018-10-02

## 2018-10-02 LAB
CLINICAL INFO: NORMAL
PATH REPORT.FINAL DX SPEC: NORMAL
SPECIMEN SOURCE: NORMAL

## 2018-10-18 ENCOUNTER — OFFICE VISIT (OUTPATIENT)
Dept: FAMILY MEDICINE CLINIC | Facility: CLINIC | Age: 58
End: 2018-10-18
Payer: COMMERCIAL

## 2018-10-18 VITALS
HEIGHT: 70 IN | BODY MASS INDEX: 22.45 KG/M2 | HEART RATE: 70 BPM | TEMPERATURE: 97.2 F | WEIGHT: 156.8 LBS | RESPIRATION RATE: 13 BRPM | SYSTOLIC BLOOD PRESSURE: 118 MMHG | OXYGEN SATURATION: 97 % | DIASTOLIC BLOOD PRESSURE: 80 MMHG

## 2018-10-18 DIAGNOSIS — A69.20 LYME DISEASE: Primary | ICD-10-CM

## 2018-10-18 PROCEDURE — 3008F BODY MASS INDEX DOCD: CPT | Performed by: FAMILY MEDICINE

## 2018-10-18 PROCEDURE — 99213 OFFICE O/P EST LOW 20 MIN: CPT | Performed by: FAMILY MEDICINE

## 2018-10-18 PROCEDURE — 1036F TOBACCO NON-USER: CPT | Performed by: FAMILY MEDICINE

## 2018-10-18 RX ORDER — DOXYCYCLINE 100 MG/1
100 TABLET ORAL 2 TIMES DAILY
Qty: 42 TABLET | Refills: 0 | Status: SHIPPED | OUTPATIENT
Start: 2018-10-18 | End: 2018-11-08

## 2018-10-18 NOTE — PROGRESS NOTES
Assessment/Plan:  The patient's Lyme disease did improve with the doxycycline but her symptoms did not resolve completely with finishing the course of the doxycycline  Therefore, we will repeat another 3 week course of the medication and monitor her closely  She will call if there is no improvement within 1-2 weeks or if the condition worsens  We will see her back as scheduled  Diagnoses and all orders for this visit:    Lyme disease  -     doxycycline (ADOXA) 100 MG tablet; Take 1 tablet (100 mg total) by mouth 2 (two) times a day for 21 days    Other orders  -     Cholecalciferol (VITAMIN D PO); Take by mouth       Return if symptoms worsen or fail to improve  Subjective:   Chief Complaint   Patient presents with    Follow-up     2 week follow up for lyme   Headache    Fatigue        Patient ID: Tracy Wong is a 62 y o  female presents today for follow-up from Lyme disease     Tracy Wong is a 62 y o  Female who presents today for follow-up of Lyme disease  The patient was last seen her on 9/24/2018 when she presented for evaluation rash  She did have laboratory testing performed which demonstrated that she was positive for Lyme disease  She was started on a 3 week course of doxycycline 100 mg twice daily  She is here today for follow-up  She is complaining of headache and fatigue  She is having aching in her knees and back  She is feeling better, but is still feeling tired at times  There is no joint swelling  There are no fevers  She has felt feverish  There were no side effects with the antibiotic  The patient denies any chest pain, shortness of breath, or palpitations  There is no edema  There are no headaches or visual changes  There is no lightheadedness, dizziness, or fainting spells  She is taking some deep breaths  There is slight nausea, there is no vomiting  She is taking the medications on and empty stomach          The following portions of the patient's history were reviewed and updated as appropriate: allergies, current medications, past family history, past medical history, past social history, past surgical history and problem list   Patient Active Problem List   Diagnosis    Anxiety, generalized    Hyperlipidemia    Vitamin D insufficiency     Past Medical History:   Diagnosis Date    Asymptomatic bacteriuria     Last Assessed: 4/8/2013     Contusion of left chest wall     Last Assessed: 10/20/2015     Elevated ALT measurement     Last Assessed: 4/8/2013    Irritable bowel syndrome     Right lateral epicondylitis     Last Assessed: 11/8/2013      Past Surgical History:   Procedure Laterality Date    NO PAST SURGERIES       Allergies   Allergen Reactions    Naproxen      Annotation - [id number]: The patient felt disoriented    Sulfa Antibiotics Rash     Family History   Problem Relation Age of Onset    Fibromyalgia Mother      Social History     Social History    Marital status: /Civil Union     Spouse name: N/A    Number of children: N/A    Years of education: N/A     Occupational History    Not on file  Social History Main Topics    Smoking status: Former Smoker    Smokeless tobacco: Never Used      Comment: Per [employer]: Former Smoker     Alcohol use 4 2 oz/week     7 Glasses of wine per week      Comment: social, Occasional Alcohol Use      Drug use: No    Sexual activity: Not on file     Other Topics Concern    Not on file     Social History Narrative    Consumes a vegan diet         Retired     Current Outpatient Prescriptions on File Prior to Visit   Medication Sig Dispense Refill    [DISCONTINUED] Cyanocobalamin (VITAMIN B12 PO) Take by mouth      [DISCONTINUED] doxycycline (ADOXA) 100 MG tablet Take 1 tablet (100 mg total) by mouth 2 (two) times a day for 21 days (Patient not taking: Reported on 10/18/2018 ) 42 tablet 0     No current facility-administered medications on file prior to visit        Review of Systems   Constitutional: Positive for fatigue  HENT: Negative  Eyes: Negative  Respiratory: Negative  Negative for apnea, cough, choking, chest tightness, shortness of breath, wheezing and stridor  Cardiovascular: Negative  Negative for chest pain, palpitations and leg swelling  Gastrointestinal: Negative  Endocrine: Negative  Genitourinary: Negative  Musculoskeletal: Negative  Skin: Negative  Allergic/Immunologic: Negative  Neurological: Positive for headaches  Hematological: Negative  Psychiatric/Behavioral: Negative  Objective:  Vitals:    10/18/18 0922   BP: 118/80   BP Location: Right arm   Patient Position: Sitting   Cuff Size: Standard   Pulse: 70   Resp: 13   Temp: (!) 97 2 °F (36 2 °C)   TempSrc: Tympanic   SpO2: 97%   Weight: 71 1 kg (156 lb 12 8 oz)   Height: 5' 10" (1 778 m)     Body mass index is 22 5 kg/m²  Wt Readings from Last 3 Encounters:   10/18/18 71 1 kg (156 lb 12 8 oz)   09/24/18 71 7 kg (158 lb)   09/17/18 71 7 kg (158 lb)     Temp Readings from Last 3 Encounters:   10/18/18 (!) 97 2 °F (36 2 °C) (Tympanic)   09/24/18 (!) 97 2 °F (36 2 °C) (Tympanic)   09/17/18 (!) 97 4 °F (36 3 °C) (Tympanic)     BP Readings from Last 3 Encounters:   10/18/18 118/80   09/24/18 126/80   09/17/18 124/76     Pulse Readings from Last 3 Encounters:   10/18/18 70   09/24/18 (!) 110   09/17/18 70        Physical Exam   Constitutional: She is oriented to person, place, and time  She appears well-developed and well-nourished  HENT:   Head: Normocephalic and atraumatic  Mouth/Throat: No oropharyngeal exudate  Eyes: Pupils are equal, round, and reactive to light  Conjunctivae and EOM are normal    Neck: Normal range of motion  No JVD present  No tracheal deviation present  No thyromegaly present  Cardiovascular: Normal rate, regular rhythm, normal heart sounds and intact distal pulses  Exam reveals no gallop and no friction rub      No murmur heard   Pulmonary/Chest: Effort normal and breath sounds normal  No stridor  No respiratory distress  She has no wheezes  She has no rales  She exhibits no tenderness  Abdominal: Soft  Bowel sounds are normal  She exhibits no distension and no mass  There is no tenderness  There is no rebound and no guarding  Musculoskeletal: Normal range of motion  She exhibits no edema, tenderness or deformity  Lymphadenopathy:     She has no cervical adenopathy  Neurological: She is alert and oriented to person, place, and time  She has normal reflexes  No cranial nerve deficit  She exhibits normal muscle tone  Coordination normal    Skin: Skin is warm and dry  Telephone on 09/27/2018   Component Date Value    SL AMB CLINICAL INFORMAT* 09/28/2018 None given     SL AMB SCREENER 09/28/2018      SL AMB PATHOLOGIST 09/28/2018      A SOURCE 09/28/2018 Urine     SL AMB LAB A GROSS DESCR* 09/28/2018      A DIAGNOSIS 09/28/2018 Paucicellular specimen, negative for malignant cells       Color UA 09/28/2018 YELLOW     Urine Appearance 09/28/2018 CLEAR     Specific Gravity 09/28/2018 1 003     Ph 09/28/2018 7 5     SL AMB GLUCOSE, URINE, Q* 09/28/2018 NEGATIVE     SL AMB BILIRUBIN, URINE 09/28/2018 NEGATIVE     SL AMB KETONE, URINE, QU* 09/28/2018 NEGATIVE     SL AMB BLOOD, URINE 09/28/2018 NEGATIVE     SL AMB PROTEIN, URINE, Q* 09/28/2018 NEGATIVE     SL AMB NITRITES URINE, Q* 09/28/2018 NEGATIVE     SL AMB LEUKOCYTE ESTERAS* 09/28/2018 NEGATIVE     SL AMB WBC, URINE 09/28/2018 NONE SEEN     RBC, Urine 09/28/2018 NONE SEEN     Squamous Epithelial Cells 09/28/2018 NONE SEEN     Bacteria, UA 09/28/2018 NONE SEEN     Hyaline Casts 09/28/2018 NONE SEEN     Urine Culture, Comprehen* 09/28/2018 NO CULTURE INDICATED    Office Visit on 09/24/2018   Component Date Value    Lyme Ab Screen 09/25/2018 >12 00*    Lyme Disease Ab (IgG), B* 09/25/2018 NEGATIVE     Lyme 18 kD IgG 09/25/2018 NON-REACTIVE     Lyme 23 kD IgG 09/25/2018 NON-REACTIVE     Lyme 28 kD IgG 09/25/2018 NON-REACTIVE     Lyme 30 kD IgG 09/25/2018 NON-REACTIVE     Lyme 39 kD IgG 09/25/2018 NON-REACTIVE     Lyme 41 kD IgG 09/25/2018 REACTIVE*    Lyme 45 kD IgG 09/25/2018 NON-REACTIVE     Lyme 58 kD IgG 09/25/2018 NON-REACTIVE     Lyme 66 kD IgG 09/25/2018 NON-REACTIVE     Lyme 93 kD IgG 09/25/2018 NON-REACTIVE     Lyme Disease Ab (IgM), B* 09/25/2018 POSITIVE*    Lyme 23 kD IgM 09/25/2018 REACTIVE*    Lyme 39 kD IgM 09/25/2018 NON-REACTIVE     Lyme 41 kD IgM 09/25/2018 REACTIVE*   Office Visit on 09/17/2018   Component Date Value    White Blood Cell Count 09/25/2018 7 5     Red Blood Cell Count 09/25/2018 4 49     Hemoglobin 09/25/2018 14 4     HCT 09/25/2018 42 8     MCV 09/25/2018 95 3     MCH 09/25/2018 32 1     MCHC 09/25/2018 33 6     RDW 09/25/2018 12 6     Platelet Count 65/55/7718 458*    SL AMB MPV 09/25/2018 9 5     Neutrophils (Absolute) 09/25/2018 4455     Lymphocytes (Absolute) 09/25/2018 2183     Monocytes (Absolute) 09/25/2018 570     Eosinophils (Absolute) 09/25/2018 240     Basophils (Absolute) 09/25/2018 53     Neutrophils 09/25/2018 59 4     Lymphocytes 09/25/2018 29 1     Monocytes 09/25/2018 7 6     Eosinophils 09/25/2018 3 2     Basophils Relative 09/25/2018 0 7     Glucose 09/25/2018 94     BUN 09/25/2018 11     Creatinine 09/25/2018 0 63     eGFR Non  09/25/2018 99     SL AMB EGFR  AMER* 09/25/2018 115     SL AMB BUN/CREATININE RA* 41/85/6154 NOT APPLICABLE     Sodium 51/18/1792 141     SL AMB POTASSIUM 09/25/2018 4 5     Chloride 09/25/2018 104     CO2 09/25/2018 31     SL AMB CALCIUM 09/25/2018 9 9     SL AMB PROTEIN, TOTAL 09/25/2018 7 0     Albumin 09/25/2018 4 2     Globulin 09/25/2018 2 8     SL AMB ALBUMIN/GLOBULIN * 09/25/2018 1 5     TOTAL BILIRUBIN 09/25/2018 0 5     Alkaline Phosphatase 09/25/2018 101     SL AMB AST 09/25/2018 19     SL AMB ALT 09/25/2018 15     Total Cholesterol 09/25/2018 169     HDL 09/25/2018 115     Triglycerides 09/25/2018 43     LDL Direct 09/25/2018 42     Chol HDLC Ratio 09/25/2018 1 5     Non-HDL Cholesterol 09/25/2018 54     SL AMB TSH W/ REFLEX TO * 09/25/2018 1 99     Color UA 09/25/2018 YELLOW     Urine Appearance 09/25/2018 CLEAR     Specific Gravity 09/25/2018 1 009     Ph 09/25/2018 7 0     SL AMB GLUCOSE, URINE, Q* 09/25/2018 NEGATIVE     SL AMB BILIRUBIN, URINE 09/25/2018 NEGATIVE     SL AMB KETONE, URINE, QU* 09/25/2018 NEGATIVE     SL AMB BLOOD, URINE 09/25/2018 1+*    SL AMB PROTEIN, URINE, Q* 09/25/2018 NEGATIVE     SL AMB NITRITES URINE, Q* 09/25/2018 NEGATIVE     SL AMB LEUKOCYTE ESTERAS* 09/25/2018 3+*    SL AMB WBC, URINE 09/25/2018 > OR = 60*    RBC, Urine 09/25/2018 0-2     Squamous Epithelial Cells 09/25/2018 0-5     Bacteria, UA 09/25/2018 FEW*    Hyaline Casts 09/25/2018 NONE SEEN     Vitamin B-12 09/25/2018 870     FOLATE, SERUM 09/25/2018 22 5     Vitamin D, 25-Hydroxy, S* 09/25/2018 29*    Ferritin 09/25/2018 89     SL AMB LAB URINE CULTURE* 09/25/2018 *

## 2018-12-13 ENCOUNTER — TELEPHONE (OUTPATIENT)
Dept: FAMILY MEDICINE CLINIC | Facility: CLINIC | Age: 58
End: 2018-12-13

## 2018-12-13 NOTE — TELEPHONE ENCOUNTER
Patient had wanted to know if we were offering the shingles vaccine  Would we be able to get her scheduled?  Please advise

## 2018-12-14 NOTE — TELEPHONE ENCOUNTER
Left message on home number to check with insurance the coverage, tried leaving a message on her cell mail box was full   trb

## 2019-01-10 ENCOUNTER — TELEPHONE (OUTPATIENT)
Dept: FAMILY MEDICINE CLINIC | Facility: CLINIC | Age: 59
End: 2019-01-10

## 2019-01-18 ENCOUNTER — CLINICAL SUPPORT (OUTPATIENT)
Dept: FAMILY MEDICINE CLINIC | Facility: CLINIC | Age: 59
End: 2019-01-18
Payer: COMMERCIAL

## 2019-01-18 DIAGNOSIS — Z23 NEED FOR ZOSTER VACCINATION: Primary | ICD-10-CM

## 2019-01-18 PROCEDURE — 90750 HZV VACC RECOMBINANT IM: CPT | Performed by: FAMILY MEDICINE

## 2019-01-18 PROCEDURE — 90471 IMMUNIZATION ADMIN: CPT | Performed by: FAMILY MEDICINE

## 2019-04-16 ENCOUNTER — TRANSCRIBE ORDERS (OUTPATIENT)
Dept: ADMINISTRATIVE | Facility: HOSPITAL | Age: 59
End: 2019-04-16

## 2019-04-16 DIAGNOSIS — Z12.31 VISIT FOR SCREENING MAMMOGRAM: Primary | ICD-10-CM

## 2019-04-25 ENCOUNTER — HOSPITAL ENCOUNTER (OUTPATIENT)
Dept: MAMMOGRAPHY | Facility: CLINIC | Age: 59
Discharge: HOME | End: 2019-04-25
Payer: COMMERCIAL

## 2019-04-25 ENCOUNTER — CLINICAL SUPPORT (OUTPATIENT)
Dept: FAMILY MEDICINE CLINIC | Facility: CLINIC | Age: 59
End: 2019-04-25
Payer: COMMERCIAL

## 2019-04-25 VITALS — WEIGHT: 156 LBS | BODY MASS INDEX: 22.33 KG/M2 | HEIGHT: 70 IN

## 2019-04-25 DIAGNOSIS — Z12.31 VISIT FOR SCREENING MAMMOGRAM: ICD-10-CM

## 2019-04-25 DIAGNOSIS — Z23 NEED FOR SHINGLES VACCINE: Primary | ICD-10-CM

## 2019-04-25 PROCEDURE — 90471 IMMUNIZATION ADMIN: CPT

## 2019-04-25 PROCEDURE — 77067 SCR MAMMO BI INCL CAD: CPT

## 2019-04-25 PROCEDURE — 90750 HZV VACC RECOMBINANT IM: CPT

## 2019-10-25 ENCOUNTER — IMMUNIZATIONS (OUTPATIENT)
Dept: FAMILY MEDICINE CLINIC | Facility: CLINIC | Age: 59
End: 2019-10-25
Payer: COMMERCIAL

## 2019-10-25 DIAGNOSIS — Z23 ENCOUNTER FOR IMMUNIZATION: ICD-10-CM

## 2019-10-25 PROCEDURE — 90682 RIV4 VACC RECOMBINANT DNA IM: CPT

## 2019-10-25 PROCEDURE — 90471 IMMUNIZATION ADMIN: CPT

## 2020-01-28 ENCOUNTER — TELEPHONE (OUTPATIENT)
Dept: FAMILY MEDICINE CLINIC | Facility: CLINIC | Age: 60
End: 2020-01-28

## 2020-01-28 DIAGNOSIS — Z11.59 ENCOUNTER FOR HEPATITIS C SCREENING TEST FOR LOW RISK PATIENT: ICD-10-CM

## 2020-01-28 DIAGNOSIS — E78.2 MIXED HYPERLIPIDEMIA: Primary | ICD-10-CM

## 2020-01-28 DIAGNOSIS — Z13.1 SCREENING FOR DIABETES MELLITUS: ICD-10-CM

## 2020-01-28 DIAGNOSIS — Z13.6 SCREENING FOR CARDIOVASCULAR CONDITION: ICD-10-CM

## 2020-01-28 DIAGNOSIS — Z11.4 SCREENING FOR HIV (HUMAN IMMUNODEFICIENCY VIRUS): ICD-10-CM

## 2020-01-28 NOTE — TELEPHONE ENCOUNTER
Pt scheduled a physical with you for the beginning of March  She wondered if you wanted blood work prior to her visit  I told her I would check and I can call her back  [phone]   Thank you

## 2020-01-31 LAB
ALBUMIN SERPL-MCNC: 4.7 G/DL (ref 3.6–5.1)
ALBUMIN/GLOB SERPL: 2 (CALC) (ref 1–2.5)
ALP SERPL-CCNC: 82 U/L (ref 33–130)
ALT SERPL-CCNC: 21 U/L (ref 6–29)
APPEARANCE UR: CLEAR
AST SERPL-CCNC: 24 U/L (ref 10–35)
BASOPHILS # BLD AUTO: 48 CELLS/UL (ref 0–200)
BASOPHILS NFR BLD AUTO: 0.9 %
BILIRUB SERPL-MCNC: 0.4 MG/DL (ref 0.2–1.2)
BILIRUB UR QL STRIP: NEGATIVE
BUN SERPL-MCNC: 13 MG/DL (ref 7–25)
BUN/CREAT SERPL: NORMAL (CALC) (ref 6–22)
CALCIUM SERPL-MCNC: 10.3 MG/DL (ref 8.6–10.4)
CHLORIDE SERPL-SCNC: 105 MMOL/L (ref 98–110)
CHOLEST SERPL-MCNC: 214 MG/DL
CHOLEST/HDLC SERPL: 1.5 (CALC)
CO2 SERPL-SCNC: 27 MMOL/L (ref 20–32)
COLOR UR: YELLOW
CREAT SERPL-MCNC: 0.7 MG/DL (ref 0.5–1.05)
EOSINOPHIL # BLD AUTO: 101 CELLS/UL (ref 15–500)
EOSINOPHIL NFR BLD AUTO: 1.9 %
ERYTHROCYTE [DISTWIDTH] IN BLOOD BY AUTOMATED COUNT: 12.4 % (ref 11–15)
GLOBULIN SER CALC-MCNC: 2.4 G/DL (CALC) (ref 1.9–3.7)
GLUCOSE SERPL-MCNC: 82 MG/DL (ref 65–139)
GLUCOSE UR QL STRIP: NEGATIVE
HCT VFR BLD AUTO: 41.9 % (ref 35–45)
HCV AB S/CO SERPL IA: 0.02
HCV AB SERPL QL IA: NORMAL
HDLC SERPL-MCNC: 142 MG/DL
HGB BLD-MCNC: 13.8 G/DL (ref 11.7–15.5)
HGB UR QL STRIP: NEGATIVE
HIV 1+2 AB+HIV1 P24 AG SERPL QL IA: NORMAL
KETONES UR QL STRIP: NEGATIVE
LDLC SERPL CALC-MCNC: 59 MG/DL (CALC)
LDLC SERPL DIRECT ASSAY-MCNC: 26 MG/DL
LEUKOCYTE ESTERASE UR QL STRIP: NEGATIVE
LYMPHOCYTES # BLD AUTO: 2449 CELLS/UL (ref 850–3900)
LYMPHOCYTES NFR BLD AUTO: 46.2 %
MCH RBC QN AUTO: 31.7 PG (ref 27–33)
MCHC RBC AUTO-ENTMCNC: 32.9 G/DL (ref 32–36)
MCV RBC AUTO: 96.1 FL (ref 80–100)
MONOCYTES # BLD AUTO: 456 CELLS/UL (ref 200–950)
MONOCYTES NFR BLD AUTO: 8.6 %
NEUTROPHILS # BLD AUTO: 2247 CELLS/UL (ref 1500–7800)
NEUTROPHILS NFR BLD AUTO: 42.4 %
NITRITE UR QL STRIP: NEGATIVE
NONHDLC SERPL-MCNC: 72 MG/DL (CALC)
PH UR STRIP: 6 [PH] (ref 5–8)
PLATELET # BLD AUTO: 348 THOUSAND/UL (ref 140–400)
PMV BLD REES-ECKER: 10.2 FL (ref 7.5–12.5)
POTASSIUM SERPL-SCNC: 4.5 MMOL/L (ref 3.5–5.3)
PROT SERPL-MCNC: 7.1 G/DL (ref 6.1–8.1)
PROT UR QL STRIP: NEGATIVE
RBC # BLD AUTO: 4.36 MILLION/UL (ref 3.8–5.1)
SL AMB EGFR AFRICAN AMERICAN: 110 ML/MIN/1.73M2
SL AMB EGFR NON AFRICAN AMERICAN: 95 ML/MIN/1.73M2
SODIUM SERPL-SCNC: 140 MMOL/L (ref 135–146)
SP GR UR STRIP: 1.01 (ref 1–1.03)
TRIGL SERPL-MCNC: 44 MG/DL
TSH SERPL-ACNC: 1.55 MIU/L (ref 0.4–4.5)
WBC # BLD AUTO: 5.3 THOUSAND/UL (ref 3.8–10.8)

## 2020-02-03 ENCOUNTER — PATIENT MESSAGE (OUTPATIENT)
Dept: FAMILY MEDICINE CLINIC | Facility: CLINIC | Age: 60
End: 2020-02-03

## 2020-02-03 DIAGNOSIS — E55.9 VITAMIN D INSUFFICIENCY: Primary | ICD-10-CM

## 2020-02-03 DIAGNOSIS — Z78.9 VEGAN DIET: ICD-10-CM

## 2020-02-03 NOTE — TELEPHONE ENCOUNTER
Gregorio Del Cid [place] 2/3/2020 10:33 AM EST        ----- Message -----  From: Anthony Cole  Sent: 2/3/2020 10:32 AM EST  To: [facility]  Subject: Non-Urgent Medical Question     This message is being sent by Tad Kirkpatrick on behalf of Jessenia Donohue, I have an annual physical scheduled w you on Mar 2  I wanted to ask if you would consider ordering labs to check my Vit D & Vit B? I take Vit D now because of a previous lab work that showed my levels were low  I am a vegan and understand that my diet may not be providing enough Vit B  Thank you :) If you do order, I'll just stop by [facility]  Thanks again for everything!  Kind regards, Meghan Camacho

## 2020-02-12 LAB
25(OH)D3 SERPL-MCNC: 56 NG/ML (ref 30–100)
SL AMB NICOTINAMIDE: <20 NG/ML
SL AMB NICOTINIC ACID: <20 NG/ML
VIT B1 BLD-SCNC: 208 NMOL/L (ref 78–185)
VIT B12 SERPL-MCNC: 1026 PG/ML (ref 200–1100)
VIT B2 SERPL-SCNC: 16.4 NMOL/L (ref 6.2–39)
VIT B6 SERPL-MCNC: 37.9 NG/ML (ref 2.1–21.7)

## 2020-03-02 ENCOUNTER — OFFICE VISIT (OUTPATIENT)
Dept: FAMILY MEDICINE CLINIC | Facility: CLINIC | Age: 60
End: 2020-03-02
Payer: COMMERCIAL

## 2020-03-02 ENCOUNTER — TELEPHONE (OUTPATIENT)
Dept: ADMINISTRATIVE | Facility: OTHER | Age: 60
End: 2020-03-02

## 2020-03-02 VITALS
SYSTOLIC BLOOD PRESSURE: 120 MMHG | BODY MASS INDEX: 23.11 KG/M2 | OXYGEN SATURATION: 98 % | HEART RATE: 74 BPM | TEMPERATURE: 98.2 F | HEIGHT: 70 IN | DIASTOLIC BLOOD PRESSURE: 84 MMHG | WEIGHT: 161.4 LBS

## 2020-03-02 DIAGNOSIS — Z12.31 ENCOUNTER FOR SCREENING MAMMOGRAM FOR BREAST CANCER: ICD-10-CM

## 2020-03-02 DIAGNOSIS — Z00.00 ANNUAL PHYSICAL EXAM: Primary | ICD-10-CM

## 2020-03-02 DIAGNOSIS — Z23 NEED FOR VACCINATION: ICD-10-CM

## 2020-03-02 PROBLEM — Z78.9 VEGAN DIET: Status: ACTIVE | Noted: 2020-03-02

## 2020-03-02 PROCEDURE — 3008F BODY MASS INDEX DOCD: CPT | Performed by: FAMILY MEDICINE

## 2020-03-02 PROCEDURE — 99396 PREV VISIT EST AGE 40-64: CPT | Performed by: FAMILY MEDICINE

## 2020-03-02 PROCEDURE — 90471 IMMUNIZATION ADMIN: CPT | Performed by: FAMILY MEDICINE

## 2020-03-02 PROCEDURE — 90715 TDAP VACCINE 7 YRS/> IM: CPT | Performed by: FAMILY MEDICINE

## 2020-03-02 NOTE — TELEPHONE ENCOUNTER
Upon review of the In Basket request we were able to locate, review, and update the patient chart as requested for Pap Smear (HPV) aka Cervical Cancer Screening  Any additional questions or concerns should be emailed to the Practice Liaisons via [email or web address] email, please do not reply via In Basket      Thank you  Radha Pacheco

## 2020-03-02 NOTE — PROGRESS NOTES
[facility] PRACTICE    NAME: Teresita Posey  AGE: 61 y o  SEX: female  : 1960     DATE: 3/2/2020     Assessment and Plan:     Problem List Items Addressed This Visit     None      Visit Diagnoses     Annual physical exam    -  Primary    Encounter for screening mammogram for breast cancer        Relevant Orders    Mammo screening bilateral w 3d & cad    Need for vaccination        Relevant Orders    TDAP VACCINE GREATER THAN OR EQUAL TO 8YO IM (Completed)        The patient had a normal exam today in the office  All of her lab work was excellent  She will continue her excellent job with her diet and exercise  She will go for the testing as ordered  She was given an update in her Adacel today in the office she will continue with her healthy diet and exercise  We will see her in the office again in a year for her next physical   Immunizations and preventive care screenings were discussed with patient today  Appropriate education was printed on patient's after visit summary  Counseling:  Dental Health: discussed importance of regular tooth brushing, flossing, and dental visits  Injury prevention: discussed safety/seat belts, safety helmets, smoke detectors, carbon dioxide detectors, and smoking near bedding or upholstery  · Exercise: the importance of regular exercise/physical activity was discussed  Recommend exercise 3-5 times per week for at least 30 minutes  Return in about 1 year (around 3/2/2021) for Annual physical      Chief Complaint:     Chief Complaint   Patient presents with    Annual Exam     Complete Physical  year old      History of Present Illness:     Adult Annual Physical   Patient here for a comprehensive physical exam  The patient reports no problems  Teresita Posey is a 61 y o  female who presents today for a complete physical  she has been feeling well and has no complaints today    The patient denies any chest pain, shortness of breath, or palpitations  There is no edema  There are no headaches or visual changes  There is no lightheadedness, dizziness, or fainting spells  There are no GI symptoms  The patient goes for dental exams every 6 months and sees her eye doctor  The patient is watching her diet and follows a regular exercise program    She saw a cardiologist recently for palpitations and has a Holter monitor in place  She has a pain in the right hip and back for months and down her leg for a few months  There is no numbness or tingling in her leg  She tried Yoga with relief  She is under a lot of stress taking care of her  with ALS  Diet and Physical Activity  · Diet/Nutrition: well balanced diet, limited junk food, low carb diet and consuming 3-5 servings of fruits/vegetables daily  · Exercise: walking and swimming- she exercises every day         Depression Screening  PHQ-9 Depression Screening    PHQ-9:    Frequency of the following problems over the past two weeks:       Little interest or pleasure in doing things:  0 - not at all  Feeling down, depressed, or hopeless:  0 - not at all  PHQ-2 Score:  0       General Health  · Sleep: sleeps well  She is sleeping better  · Hearing: decreased - bilateral   She has tinnitus- sees an ENT  · Vision: no vision problems, goes for regular eye exams, most recent eye exam <1 year ago, wears glasses and wears contacts  She saw the eye doctor recently  · Dental: regular dental visits and brushes teeth twice daily  /GYN Health  · Patient is: postmenopausal  She sees her GYN regularly  She sees a specialist in the [facility] [place] [facility]     Review of Systems:     Review of Systems   Constitutional: Negative  HENT: Negative  Eyes: Negative  Respiratory: Negative  Cardiovascular: Negative  Gastrointestinal: Negative  Endocrine: Negative  Genitourinary: Negative  Musculoskeletal: Negative      Skin: Negative  Allergic/Immunologic: Negative  Neurological: Negative  Hematological: Negative  Psychiatric/Behavioral: Negative  Past Medical History:     Past Medical History:   Diagnosis Date    Asymptomatic bacteriuria     Last Assessed: 4/8/2013     Contusion of left chest wall     Last Assessed: 10/20/2015     Elevated ALT measurement     Last Assessed: 4/8/2013    Irritable bowel syndrome     Right lateral epicondylitis     Last Assessed: 11/8/2013       Past Surgical History:     Past Surgical History:   Procedure Laterality Date    BREAST BIOPSY Left 1994    EXCISIONAL BIOPSY BENIGN    NO PAST SURGERIES        Social History:        Social History     Socioeconomic History    Marital status: /Civil Union     Spouse name: None    Number of children: None    Years of education: None    Highest education level: None   Occupational History    None   Social Needs    Financial resource strain: None    Food insecurity:     Worry: None     Inability: None    Transportation needs:     Medical: None     Non-medical: None   Tobacco Use    Smoking status: Former Smoker    Smokeless tobacco: Never Used    Tobacco comment: Per [employer]: Former Smoker    Substance and Sexual Activity    Alcohol use:  Yes     Alcohol/week: 7 0 standard drinks     Types: 7 Glasses of wine per week     Comment: social, Occasional Alcohol Use      Drug use: No    Sexual activity: None   Lifestyle    Physical activity:     Days per week: None     Minutes per session: None    Stress: None   Relationships    Social connections:     Talks on phone: None     Gets together: None     Attends [employer] service: None     Active member of club or organization: None     Attends meetings of clubs or organizations: None     Relationship status: None    Intimate partner violence:     Fear of current or ex partner: None     Emotionally abused: None     Physically abused: None     Forced sexual activity: None Other Topics Concern    None   Social History Narrative    Consumes a vegan diet         Retired      Family History:     Family History   Problem Relation Age of Onset    Fibromyalgia Mother     Cervical cancer Maternal Grandmother     Colon cancer Paternal Grandmother     Breast cancer Cousin 40      Current Medications:     Current Outpatient Medications   Medication Sig Dispense Refill    Cholecalciferol (VITAMIN D PO) Take by mouth       No current facility-administered medications for this visit  Allergies: Allergies   Allergen Reactions    Naproxen      Annotation - [id number]: The patient felt disoriented    Sulfa Antibiotics Rash      Physical Exam:     /84 (BP Location: Left arm, Patient Position: Sitting, Cuff Size: Standard)   Pulse 74   Temp 98 2 °F (36 8 °C) (Tympanic)   Ht 5' 10" (1 778 m)   Wt 73 2 kg (161 lb 6 4 oz)   LMP  (LMP Unknown)   SpO2 98%   BMI 23 16 kg/m²     Physical Exam   Constitutional: She is oriented to person, place, and time  She appears well-developed and well-nourished  HENT:   Head: Normocephalic and atraumatic  Mouth/Throat: No oropharyngeal exudate  Eyes: Pupils are equal, round, and reactive to light  Conjunctivae and EOM are normal    Neck: Normal range of motion  No JVD present  No tracheal deviation present  No thyromegaly present  Cardiovascular: Normal rate, regular rhythm, normal heart sounds and intact distal pulses  Exam reveals no gallop and no friction rub  No murmur heard  Pulmonary/Chest: Effort normal and breath sounds normal  No stridor  No respiratory distress  She has no wheezes  She has no rales  She exhibits no tenderness  Abdominal: Soft  Bowel sounds are normal  She exhibits no distension and no mass  There is no tenderness  There is no rebound and no guarding  Musculoskeletal: Normal range of motion  She exhibits no edema, tenderness or deformity  Lymphadenopathy:     She has no cervical adenopathy  Neurological: She is alert and oriented to person, place, and time  She has normal reflexes  She displays normal reflexes  No cranial nerve deficit  She exhibits normal muscle tone  Coordination normal    Skin: Skin is warm and dry       Patient Message on 02/03/2020   Component Date Value    Vitamin D, 25-Hydroxy, S* 02/05/2020 56     Vitamin B-12 02/05/2020 1,026     Vitamin B2 (Riboflavin, * 02/05/2020 16 4     Vitamin B6 02/05/2020 37 9*    Vitamin B1, Whole Blood 02/05/2020 208*    Nicotinic Acid 02/05/2020 <20     Nicotinamide 02/05/2020 <20    Telephone on 01/28/2020   Component Date Value    White Blood Cell Count 01/30/2020 5 3     Red Blood Cell Count 01/30/2020 4 36     Hemoglobin 01/30/2020 13 8     HCT 01/30/2020 41 9     MCV 01/30/2020 96 1     MCH 01/30/2020 31 7     MCHC 01/30/2020 32 9     RDW 01/30/2020 12 4     Platelet Count 46/99/0501 348     SL AMB MPV 01/30/2020 10 2     Neutrophils (Absolute) 01/30/2020 2,247     Lymphocytes (Absolute) 01/30/2020 2,449     Monocytes (Absolute) 01/30/2020 456     Eosinophils (Absolute) 01/30/2020 101     Basophils ABS 01/30/2020 48     Neutrophils 01/30/2020 42 4     Lymphocytes 01/30/2020 46 2     Monocytes 01/30/2020 8 6     Eosinophils 01/30/2020 1 9     Basophils PCT 01/30/2020 0 9     Glucose, Random 01/30/2020 82     BUN 01/30/2020 13     Creatinine 01/30/2020 0 70     eGFR Non African American 01/30/2020 95     eGFR  01/30/2020 110     SL AMB BUN/CREATININE RA* 36/43/7586 NOT APPLICABLE     Sodium 06/11/8237 140     Potassium 01/30/2020 4 5     Chloride 01/30/2020 105     CO2 01/30/2020 27     SL AMB CALCIUM 01/30/2020 10 3     Protein, Total 01/30/2020 7 1     Albumin 01/30/2020 4 7     Globulin 01/30/2020 2 4     Albumin/Globulin Ratio 01/30/2020 2 0     TOTAL BILIRUBIN 01/30/2020 0 4     Alkaline Phosphatase 01/30/2020 82     AST 01/30/2020 24     ALT 01/30/2020 21     LDL Cholesterol 01/30/2020 26     Total Cholesterol 01/30/2020 214*    HDL 01/30/2020 142     Triglycerides 01/30/2020 44     LDL Direct 01/30/2020 59     Chol HDLC Ratio 01/30/2020 1 5     Non-HDL Cholesterol 01/30/2020 72     TSH W/RFX TO FREE T4 01/30/2020 1 55     Color UA 01/30/2020 YELLOW     Urine Appearance 01/30/2020 CLEAR     Specific Gravity 01/30/2020 1 007     Ph 01/30/2020 6 0     Glucose, Urine 01/30/2020 NEGATIVE     Bilirubin, Urine 01/30/2020 NEGATIVE     Ketone, Urine 01/30/2020 NEGATIVE     Blood, Urine 01/30/2020 NEGATIVE     Protein, Urine 01/30/2020 NEGATIVE     SL AMB NITRITES URINE, Q* 01/30/2020 NEGATIVE     Leukocyte Esterase 01/30/2020 NEGATIVE     HIV AG/AB, 4th Gen 01/30/2020 NON-REACTIVE     HEP C AB 01/30/2020 NON-REACTIVE     Signal to Cut-Off 01/30/2020 0 02           Derrick Youngblood, DO  [facility]

## 2020-03-02 NOTE — TELEPHONE ENCOUNTER
----- Message from Acosta Francois DO sent at 3/2/2020  8:41 AM EST -----  Regarding: Cervical Cancer screen  03/02/20 8:41 AM    Hello, our patient Sheryle Blower has had Pap Smear (HPV) aka Cervical Cancer Screening completed/performed  Please assist in updating the patient chart by pulling the Care Everywhere (CE) document  The date of service is 4/25/2019       Thank you,  Acosta Francois DO  [facility]

## 2020-03-02 NOTE — PATIENT INSTRUCTIONS
Wellness Visit for Adults   AMBULATORY CARE:   A wellness visit  is when you see your healthcare provider to get screened for health problems  You can also get advice on how to stay healthy  Write down your questions so you remember to ask them  Ask your healthcare provider how often you should have a wellness visit  What happens at a wellness visit:  Your healthcare provider will ask about your health, and your family history of health problems  This includes high blood pressure, heart disease, and cancer  He or she will ask if you have symptoms that concern you, if you smoke, and about your mood  You may also be asked about your intake of medicines, supplements, food, and alcohol  Any of the following may be done:  · Your weight  will be checked  Your height may also be checked so your body mass index (BMI) can be calculated  Your BMI shows if you are at a healthy weight  · Your blood pressure  and heart rate will be checked  Your temperature may also be checked  · Blood and urine tests  may be done  Blood tests may be done to check your cholesterol levels  Abnormal cholesterol levels increase your risk for heart disease and stroke  You may also need a blood or urine test to check for diabetes if you are at increased risk  Urine tests may be done to look for signs of an infection or kidney disease  · A physical exam  includes checking your heartbeat and lungs with a stethoscope  Your healthcare provider may also check your skin to look for sun damage  · Screening tests  may be recommended  A screening test is done to check for diseases that may not cause symptoms  The screening tests you may need depend on your age, gender, family history, and lifestyle habits  For example, colorectal screening may be recommended if you are 48years old or older  Screening tests you need if you are a woman:   · A Pap smear  is used to screen for cervical cancer   Pap smears are usually done every 3 to 5 years depending on your age  You may need them more often if you have had abnormal Pap smear test results in the past  Ask your healthcare provider how often you should have a Pap smear  · A mammogram  is an x-ray of your breasts to screen for breast cancer  Experts recommend mammograms every 2 years starting at age 48 years  You may need a mammogram at age 52 years or younger if you have an increased risk for breast cancer  Talk to your healthcare provider about when you should start having mammograms and how often you need them  Vaccines you may need:   · Get an influenza vaccine  every year  The influenza vaccine protects you from the flu  Several types of viruses cause the flu  The viruses change over time, so new vaccines are made each year  · Get a tetanus-diphtheria (Td) booster vaccine  every 10 years  This vaccine protects you against tetanus and diphtheria  Tetanus is a severe infection that may cause painful muscle spasms and lockjaw  Diphtheria is a severe bacterial infection that causes a thick covering in the back of your mouth and throat  · Get a human papillomavirus (HPV) vaccine  if you are female and aged 23 to 32 or male 23 to 24 and never received it  This vaccine protects you from HPV infection  HPV is the most common infection spread by sexual contact  HPV may also cause vaginal, penile, and anal cancers  · Get a pneumococcal vaccine  if you are aged 72 years or older  The pneumococcal vaccine is an injection given to protect you from pneumococcal disease  Pneumococcal disease is an infection caused by pneumococcal bacteria  The infection may cause pneumonia, meningitis, or an ear infection  · Get a shingles vaccine  if you are aged 61 or older, even if you have had shingles before  The shingles vaccine is an injection to protect you from the varicella-zoster virus  This is the same virus that causes chickenpox   Shingles is a painful rash that develops in people who had chickenpox or have been exposed to the virus  How to eat healthy:  My Plate is a model for planning healthy meals  It shows the types and amounts of foods that should go on your plate  Fruits and vegetables make up about half of your plate, and grains and protein make up the other half  A serving of dairy is included on the side of your plate  The amount of calories and serving sizes you need depends on your age, gender, weight, and height  Examples of healthy foods are listed below:  · Eat a variety of vegetables  such as dark green, red, and orange vegetables  You can also include canned vegetables low in sodium (salt) and frozen vegetables without added butter or sauces  · Eat a variety of fresh fruits , canned fruit in 100% juice, frozen fruit, and dried fruit  · Include whole grains  At least half of the grains you eat should be whole grains  Examples include whole-wheat bread, wheat pasta, brown rice, and whole-grain cereals such as oatmeal     · Eat a variety of protein foods such as seafood (fish and shellfish), lean meat, and poultry without skin (turkey and chicken)  Examples of lean meats include pork leg, shoulder, or tenderloin, and beef round, sirloin, tenderloin, and extra lean ground beef  Other protein foods include eggs and egg substitutes, beans, peas, soy products, nuts, and seeds  · Choose low-fat dairy products such as skim or 1% milk or low-fat yogurt, cheese, and cottage cheese  · Limit unhealthy fats  such as butter, hard margarine, and shortening  Exercise:  Exercise at least 30 minutes per day on most days of the week  Some examples of exercise include walking, biking, dancing, and swimming  You can also fit in more physical activity by taking the stairs instead of the elevator or parking farther away from stores  Include muscle strengthening activities 2 days each week  Regular exercise provides many health benefits   It helps you manage your weight, and decreases your risk for type 2 diabetes, heart disease, stroke, and high blood pressure  Exercise can also help improve your mood  Ask your healthcare provider about the best exercise plan for you  General health and safety guidelines:   · Do not smoke  Nicotine and other chemicals in cigarettes and cigars can cause lung damage  Ask your healthcare provider for information if you currently smoke and need help to quit  E-cigarettes or smokeless tobacco still contain nicotine  Talk to your healthcare provider before you use these products  · Limit alcohol  A drink of alcohol is 12 ounces of beer, 5 ounces of wine, or 1½ ounces of liquor  · Lose weight, if needed  Being overweight increases your risk of certain health conditions  These include heart disease, high blood pressure, type 2 diabetes, and certain types of cancer  · Protect your skin  Do not sunbathe or use tanning beds  Use sunscreen with a SPF 15 or higher  Apply sunscreen at least 15 minutes before you go outside  Reapply sunscreen every 2 hours  Wear protective clothing, hats, and sunglasses when you are outside  · Drive safely  Always wear your seatbelt  Make sure everyone in your car wears a seatbelt  A seatbelt can save your life if you are in an accident  Do not use your cell phone when you are driving  This could distract you and cause an accident  Pull over if you need to make a call or send a text message  · Practice safe sex  Use latex condoms if are sexually active and have more than one partner  Your healthcare provider may recommend screening tests for sexually transmitted infections (STIs)  · Wear helmets, lifejackets, and protective gear  Always wear a helmet when you ride a bike or motorcycle, go skiing, or play sports that could cause a head injury  Wear protective equipment when you play sports  Wear a lifejacket when you are on a boat or doing water sports    © 2017 [employer] Information is for End User's use only and may not be sold, redistributed or otherwise used for commercial purposes  All illustrations and images included in CareNotes® are the copyrighted property of [employer]  or Garth Wilkinson  The above information is an  only  It is not intended as medical advice for individual conditions or treatments  Talk to your doctor, nurse or pharmacist before following any medical regimen to see if it is safe and effective for you  Lower Back Exercises   WHAT YOU NEED TO KNOW:   Lower back exercises help heal and strengthen your back muscles to prevent another injury  Ask your healthcare provider if you need to see a physical therapist for more advanced exercises  DISCHARGE INSTRUCTIONS:   Return to the emergency department if:   · You have severe pain that prevents you from moving  Contact your healthcare provider if:   · Your pain becomes worse  · You have new pain  · You have questions or concerns about your condition or care  Do lower back exercises safely:   · Do the exercises on a mat or firm surface  (not on a bed) to support your spine and prevent low back pain  · Move slowly and smoothly  Avoid fast or jerky motions  · Breathe normally  Do not hold your breath  · Stop if you feel pain  It is normal to feel some discomfort at first  Regular exercise will help decrease your discomfort over time  Lower back exercises: Your healthcare provider may recommend that you do back exercises 10 to 30 minutes each day  He may also recommend that you do exercises 1 to 3 times each day  Ask your healthcare provider which exercises are best for you and how often to do them  · Ankle pumps:  Lie on your back  Move your foot up (with your toes pointing toward your head)  Then, move your foot down (with your toes pointing away from you)  Repeat this exercise 10 times on each side  · Heel slides:  Lie on your back  Slowly bend one leg and then straighten it   Next, bend the other leg and then straighten it  Repeat 10 times on each side  · Pelvic tilt:  Lie on your back with your knees bent and feet flat on the floor  Place your arms in a relaxed position beside your body  Tighten the muscles of your abdomen and flatten your back against the floor  Hold for 5 seconds  Repeat 5 times  · Back stretch:  Lie on your back with your hands behind your head  Bend your knees and turn the lower half of your body to one side  Hold this position for 10 seconds  Repeat 3 times on each side  · Straight leg raises:  Lie on your back with one leg straight  Bend the other knee  Tighten your abdomen and then slowly lift the straight leg up about 6 to 12 inches off the floor  Hold for 1 to 5 seconds  Lower your leg slowly  Repeat 10 times on each leg  · Knee-to-chest:  Lie on your back with your knees bent and feet flat on the floor  Pull one of your knees toward your chest and hold it there for 5 seconds  Return your leg to the starting position  Lift the other knee toward your chest and hold for 5 seconds  Do this 5 times on each side  · Cat and camel:  Place your hands and knees on the floor  Arch your back upward toward the ceiling and lower your head  Round out your spine as much as you can  Hold for 5 seconds  Lift your head upward and push your chest downward toward the floor  Hold for 5 seconds  Do 3 sets or as directed  · Wall squats:  Stand with your back against a wall  Tighten the muscles of your abdomen  Slowly lower your body until your knees are bent at a 45 degree angle  Hold this position for 5 seconds  Slowly move back up to a standing position  Repeat 10 times  · Curl up:  Lie on your back with your knees bent and feet flat on the floor  Place your hands, palms down, underneath the curve in your lower back  Next, with your elbows on the floor, lift your shoulders and chest 2 to 3 inches   Keep your head in line with your shoulders  Hold this position for 5 seconds  When you can do this exercise without pain for 10 to 15 seconds, you may add a rotation  While your shoulders and chest are lifted off the ground, turn slightly to the left and hold  Repeat on the other side  · Bird dog:  Place your hands and knees on the floor  Keep your wrists directly below your shoulders and your knees directly below your hips  Pull your belly button in toward your spine  Do not flatten or arch your back  Tighten your abdominal muscles  Raise one arm straight out so that it is aligned with your head  Next, raise the leg opposite your arm  Hold this position for 15 seconds  Lower your arm and leg slowly and change sides  Do 5 sets  © 2017 [facility] Information is for End User's use only and may not be sold, redistributed or otherwise used for commercial purposes  All illustrations and images included in CareNotes® are the copyrighted property of [employer]  or Garth Wilkinson  The above information is an  only  It is not intended as medical advice for individual conditions or treatments  Talk to your doctor, nurse or pharmacist before following any medical regimen to see if it is safe and effective for you

## 2020-03-04 DIAGNOSIS — R00.2 PALPITATIONS: Primary | ICD-10-CM

## 2020-03-04 DIAGNOSIS — R01.1 MIDSYSTOLIC MURMUR: ICD-10-CM

## 2020-07-23 ENCOUNTER — HOSPITAL ENCOUNTER (OUTPATIENT)
Dept: MAMMOGRAPHY | Facility: CLINIC | Age: 60
Discharge: HOME | End: 2020-07-23
Payer: COMMERCIAL

## 2020-07-23 DIAGNOSIS — Z12.31 ENCOUNTER FOR SCREENING MAMMOGRAM FOR BREAST CANCER: ICD-10-CM

## 2020-07-23 PROCEDURE — 77067 SCR MAMMO BI INCL CAD: CPT

## 2020-07-23 PROCEDURE — 77063 BREAST TOMOSYNTHESIS BI: CPT

## 2020-10-26 ENCOUNTER — OFFICE VISIT (OUTPATIENT)
Dept: FAMILY MEDICINE CLINIC | Facility: CLINIC | Age: 60
End: 2020-10-26
Payer: COMMERCIAL

## 2020-10-26 ENCOUNTER — APPOINTMENT (OUTPATIENT)
Dept: RADIOLOGY | Facility: CLINIC | Age: 60
End: 2020-10-26
Payer: COMMERCIAL

## 2020-10-26 VITALS
TEMPERATURE: 98.1 F | DIASTOLIC BLOOD PRESSURE: 84 MMHG | SYSTOLIC BLOOD PRESSURE: 120 MMHG | HEART RATE: 62 BPM | OXYGEN SATURATION: 99 % | WEIGHT: 139.8 LBS | BODY MASS INDEX: 20.01 KG/M2 | HEIGHT: 70 IN

## 2020-10-26 DIAGNOSIS — Z78.9 VEGAN DIET: ICD-10-CM

## 2020-10-26 DIAGNOSIS — R63.4 ABNORMAL WEIGHT LOSS: Primary | ICD-10-CM

## 2020-10-26 DIAGNOSIS — R63.4 ABNORMAL WEIGHT LOSS: ICD-10-CM

## 2020-10-26 PROCEDURE — 71046 X-RAY EXAM CHEST 2 VIEWS: CPT

## 2020-10-26 PROCEDURE — 99214 OFFICE O/P EST MOD 30 MIN: CPT | Performed by: FAMILY MEDICINE

## 2020-10-26 PROCEDURE — 3725F SCREEN DEPRESSION PERFORMED: CPT | Performed by: FAMILY MEDICINE

## 2020-10-26 RX ORDER — ESTRADIOL 0.1 MG/G
CREAM VAGINAL
COMMUNITY
Start: 2020-09-11 | End: 2021-04-19

## 2020-10-31 LAB
ALBUMIN SERPL-MCNC: 4.9 G/DL (ref 3.8–4.9)
ALBUMIN/GLOB SERPL: 1.9 {RATIO} (ref 1.2–2.2)
ALP SERPL-CCNC: 105 IU/L (ref 39–117)
ALT SERPL-CCNC: 20 IU/L (ref 0–32)
AST SERPL-CCNC: 24 IU/L (ref 0–40)
BASOPHILS # BLD AUTO: 0 X10E3/UL (ref 0–0.2)
BASOPHILS NFR BLD AUTO: 1 %
BILIRUB SERPL-MCNC: 0.4 MG/DL (ref 0–1.2)
BUN SERPL-MCNC: 11 MG/DL (ref 8–27)
BUN/CREAT SERPL: 16 (ref 12–28)
CALCIUM SERPL-MCNC: 10.5 MG/DL (ref 8.7–10.3)
CHLORIDE SERPL-SCNC: 104 MMOL/L (ref 96–106)
CO2 SERPL-SCNC: 27 MMOL/L (ref 20–29)
CREAT SERPL-MCNC: 0.7 MG/DL (ref 0.57–1)
EOSINOPHIL # BLD AUTO: 0.1 X10E3/UL (ref 0–0.4)
EOSINOPHIL NFR BLD AUTO: 1 %
ERYTHROCYTE [DISTWIDTH] IN BLOOD BY AUTOMATED COUNT: 12.5 % (ref 11.7–15.4)
GLOBULIN SER-MCNC: 2.6 G/DL (ref 1.5–4.5)
GLUCOSE SERPL-MCNC: 88 MG/DL (ref 65–99)
HCT VFR BLD AUTO: 43 % (ref 34–46.6)
HGB BLD-MCNC: 14.2 G/DL (ref 11.1–15.9)
IMM GRANULOCYTES # BLD: 0 X10E3/UL (ref 0–0.1)
IMM GRANULOCYTES NFR BLD: 0 %
LYMPHOCYTES # BLD AUTO: 2.5 X10E3/UL (ref 0.7–3.1)
LYMPHOCYTES NFR BLD AUTO: 42 %
MCH RBC QN AUTO: 32.1 PG (ref 26.6–33)
MCHC RBC AUTO-ENTMCNC: 33 G/DL (ref 31.5–35.7)
MCV RBC AUTO: 97 FL (ref 79–97)
MONOCYTES # BLD AUTO: 0.5 X10E3/UL (ref 0.1–0.9)
MONOCYTES NFR BLD AUTO: 9 %
NEUTROPHILS # BLD AUTO: 2.8 X10E3/UL (ref 1.4–7)
NEUTROPHILS NFR BLD AUTO: 47 %
PLATELET # BLD AUTO: 358 X10E3/UL (ref 150–450)
POTASSIUM SERPL-SCNC: 4.4 MMOL/L (ref 3.5–5.2)
PROT SERPL-MCNC: 7.5 G/DL (ref 6–8.5)
RBC # BLD AUTO: 4.43 X10E6/UL (ref 3.77–5.28)
SL AMB EGFR AFRICAN AMERICAN: 109 ML/MIN/1.73
SL AMB EGFR NON AFRICAN AMERICAN: 94 ML/MIN/1.73
SODIUM SERPL-SCNC: 141 MMOL/L (ref 134–144)
T4 FREE SERPL-MCNC: 1.19 NG/DL (ref 0.82–1.77)
TSH SERPL DL<=0.005 MIU/L-ACNC: 3.25 UIU/ML (ref 0.45–4.5)
WBC # BLD AUTO: 6 X10E3/UL (ref 3.4–10.8)

## 2020-11-17 ENCOUNTER — TELEPHONE (OUTPATIENT)
Dept: GASTROENTEROLOGY | Facility: CLINIC | Age: 60
End: 2020-11-17

## 2020-11-17 ENCOUNTER — TELEMEDICINE (OUTPATIENT)
Dept: GASTROENTEROLOGY | Facility: CLINIC | Age: 60
End: 2020-11-17
Payer: COMMERCIAL

## 2020-11-17 VITALS — HEIGHT: 70 IN | BODY MASS INDEX: 19.33 KG/M2 | WEIGHT: 135 LBS

## 2020-11-17 DIAGNOSIS — R63.4 ABNORMAL WEIGHT LOSS: ICD-10-CM

## 2020-11-17 DIAGNOSIS — Z12.11 COLON CANCER SCREENING: Primary | ICD-10-CM

## 2020-11-17 PROCEDURE — 1036F TOBACCO NON-USER: CPT | Performed by: NURSE PRACTITIONER

## 2020-11-17 PROCEDURE — 3008F BODY MASS INDEX DOCD: CPT | Performed by: NURSE PRACTITIONER

## 2020-11-17 PROCEDURE — 99243 OFF/OP CNSLTJ NEW/EST LOW 30: CPT | Performed by: NURSE PRACTITIONER

## 2020-11-18 ENCOUNTER — TELEPHONE (OUTPATIENT)
Dept: GASTROENTEROLOGY | Facility: CLINIC | Age: 60
End: 2020-11-18

## 2020-11-20 ENCOUNTER — TELEPHONE (OUTPATIENT)
Dept: FAMILY MEDICINE CLINIC | Facility: CLINIC | Age: 60
End: 2020-11-20

## 2020-11-20 DIAGNOSIS — Z11.9 ENCOUNTER FOR SCREENING FOR INFECTIOUS AND PARASITIC DISEASES, UNSPECIFIED: Primary | ICD-10-CM

## 2020-12-03 ENCOUNTER — HOSPITAL ENCOUNTER (OUTPATIENT)
Dept: CT IMAGING | Facility: HOSPITAL | Age: 60
Discharge: HOME | End: 2020-12-03
Payer: COMMERCIAL

## 2020-12-03 DIAGNOSIS — R63.4 ABNORMAL WEIGHT LOSS: ICD-10-CM

## 2020-12-03 PROCEDURE — 74177 CT ABD & PELVIS W/CONTRAST: CPT

## 2020-12-03 PROCEDURE — G1004 CDSM NDSC: HCPCS

## 2020-12-03 RX ADMIN — IOHEXOL 100 ML: 350 INJECTION, SOLUTION INTRAVENOUS at 14:27

## 2020-12-04 ENCOUNTER — TELEPHONE (OUTPATIENT)
Dept: GASTROENTEROLOGY | Facility: CLINIC | Age: 60
End: 2020-12-04

## 2020-12-18 ENCOUNTER — TELEMEDICINE (OUTPATIENT)
Dept: GASTROENTEROLOGY | Facility: CLINIC | Age: 60
End: 2020-12-18
Payer: COMMERCIAL

## 2020-12-18 VITALS — BODY MASS INDEX: 19.37 KG/M2 | HEIGHT: 70 IN

## 2020-12-18 DIAGNOSIS — Z12.11 COLON CANCER SCREENING: ICD-10-CM

## 2020-12-18 DIAGNOSIS — R63.4 ABNORMAL WEIGHT LOSS: Primary | ICD-10-CM

## 2020-12-18 PROCEDURE — 99213 OFFICE O/P EST LOW 20 MIN: CPT | Performed by: INTERNAL MEDICINE

## 2020-12-22 ENCOUNTER — TELEPHONE (OUTPATIENT)
Dept: GASTROENTEROLOGY | Facility: CLINIC | Age: 60
End: 2020-12-22

## 2021-01-19 DIAGNOSIS — Z23 ENCOUNTER FOR IMMUNIZATION: ICD-10-CM

## 2021-01-21 ENCOUNTER — IMMUNIZATIONS (OUTPATIENT)
Dept: FAMILY MEDICINE CLINIC | Facility: HOSPITAL | Age: 61
End: 2021-01-21

## 2021-01-21 DIAGNOSIS — Z23 ENCOUNTER FOR IMMUNIZATION: Primary | ICD-10-CM

## 2021-01-21 PROCEDURE — 91301 SARS-COV-2 / COVID-19 MRNA VACCINE (MODERNA) 100 MCG: CPT

## 2021-01-21 PROCEDURE — 0011A SARS-COV-2 / COVID-19 MRNA VACCINE (MODERNA) 100 MCG: CPT

## 2021-02-19 ENCOUNTER — IMMUNIZATIONS (OUTPATIENT)
Dept: FAMILY MEDICINE CLINIC | Facility: HOSPITAL | Age: 61
End: 2021-02-19

## 2021-02-19 DIAGNOSIS — Z23 ENCOUNTER FOR IMMUNIZATION: Primary | ICD-10-CM

## 2021-02-19 PROCEDURE — 91301 SARS-COV-2 / COVID-19 MRNA VACCINE (MODERNA) 100 MCG: CPT

## 2021-02-19 PROCEDURE — 0012A SARS-COV-2 / COVID-19 MRNA VACCINE (MODERNA) 100 MCG: CPT

## 2021-02-22 ENCOUNTER — TELEPHONE (OUTPATIENT)
Dept: FAMILY MEDICINE CLINIC | Facility: CLINIC | Age: 61
End: 2021-02-22

## 2021-02-22 DIAGNOSIS — M79.671 RIGHT FOOT PAIN: Primary | ICD-10-CM

## 2021-02-22 NOTE — TELEPHONE ENCOUNTER
Advised patient she can schedule appointment with Chiropractor  Put order and referral in her chart    trbrandon

## 2021-04-01 DIAGNOSIS — Z12.83 SCREENING FOR MALIGNANT NEOPLASM OF SKIN: Primary | ICD-10-CM

## 2021-04-19 ENCOUNTER — OFFICE VISIT (OUTPATIENT)
Dept: FAMILY MEDICINE CLINIC | Facility: CLINIC | Age: 61
End: 2021-04-19
Payer: COMMERCIAL

## 2021-04-19 VITALS
WEIGHT: 138 LBS | DIASTOLIC BLOOD PRESSURE: 78 MMHG | BODY MASS INDEX: 19.76 KG/M2 | HEART RATE: 68 BPM | SYSTOLIC BLOOD PRESSURE: 124 MMHG | HEIGHT: 70 IN | TEMPERATURE: 97.1 F | OXYGEN SATURATION: 99 %

## 2021-04-19 DIAGNOSIS — Z78.9 VEGAN DIET: ICD-10-CM

## 2021-04-19 DIAGNOSIS — Z00.00 ANNUAL PHYSICAL EXAM: Primary | ICD-10-CM

## 2021-04-19 DIAGNOSIS — Z13.1 SCREENING FOR DIABETES MELLITUS: ICD-10-CM

## 2021-04-19 DIAGNOSIS — Z13.6 SCREENING FOR CARDIOVASCULAR CONDITION: ICD-10-CM

## 2021-04-19 DIAGNOSIS — E55.9 VITAMIN D INSUFFICIENCY: ICD-10-CM

## 2021-04-19 PROCEDURE — 3725F SCREEN DEPRESSION PERFORMED: CPT | Performed by: FAMILY MEDICINE

## 2021-04-19 PROCEDURE — 3008F BODY MASS INDEX DOCD: CPT | Performed by: FAMILY MEDICINE

## 2021-04-19 PROCEDURE — 99396 PREV VISIT EST AGE 40-64: CPT | Performed by: FAMILY MEDICINE

## 2021-04-19 RX ORDER — VIT A/VIT C/VIT E/ZINC/COPPER 2148-113
TABLET ORAL 2 TIMES DAILY
COMMUNITY
End: 2022-04-21

## 2021-04-19 RX ORDER — PROPRANOLOL/HYDROCHLOROTHIAZID 40 MG-25MG
1 TABLET ORAL 2 TIMES DAILY
COMMUNITY
End: 2022-04-21

## 2021-04-19 NOTE — PROGRESS NOTES
[facility]    NAME: Sandy Harper  AGE: 61 y o  SEX: female  : 1960     DATE: 2021     Assessment and Plan:     Problem List Items Addressed This Visit        Other    Vegan diet    Relevant Orders    CBC and differential    Comprehensive metabolic panel    Lipid Panel with Direct LDL reflex    UA (URINE) with reflex to Scope    TSH, 3rd generation with Free T4 reflex    Vitamin D 25 hydroxy    Vitamin D insufficiency    Relevant Orders    Vitamin D 25 hydroxy      Other Visit Diagnoses     Annual physical exam    -  Primary    Relevant Orders    CBC and differential    Comprehensive metabolic panel    Lipid Panel with Direct LDL reflex    UA (URINE) with reflex to Scope    TSH, 3rd generation with Free T4 reflex    Vitamin D 25 hydroxy    Screening for cardiovascular condition        Relevant Orders    CBC and differential    Comprehensive metabolic panel    Lipid Panel with Direct LDL reflex    UA (URINE) with reflex to Scope    TSH, 3rd generation with Free T4 reflex    Vitamin D 25 hydroxy    Screening for diabetes mellitus        Relevant Orders    CBC and differential    Comprehensive metabolic panel    Lipid Panel with Direct LDL reflex    UA (URINE) with reflex to Scope    TSH, 3rd generation with Free T4 reflex    Vitamin D 25 hydroxy        The patient had a normal exam today in the office  She will continue with her healthy diet and exercise  She will go for the lab work as ordered and we will follow up with the results  She is current with all of her immunizations  We will see her back in the office as scheduled  Immunizations and preventive care screenings were discussed with patient today  Appropriate education was printed on patient's after visit summary  Counseling:  Dental Health: discussed importance of regular tooth brushing, flossing, and dental visits    Injury prevention: discussed safety/seat belts, safety helmets, smoke detectors, carbon dioxide detectors, and smoking near bedding or upholstery  · Exercise: the importance of regular exercise/physical activity was discussed  Recommend exercise 3-5 times per week for at least 30 minutes  Return for Recheck  Chief Complaint:     Chief Complaint   Patient presents with    Annual Exam     Complete Physical 61year old      History of Present Illness:     Adult Annual Physical   Patient here for a comprehensive physical exam  The patient reports no problems  Rosetta Lyn is a 61 y o  female who presents today for a complete physical  she   has been feeling well and has no complaints today  The patient denies any chest pain, shortness of breath, or palpitations  There is no edema  There are no headaches or visual changes  There is no lightheadedness, dizziness, or fainting spells  There are no GI symptoms  The patient goes for dental exams every 6 months and sees her eye doctor  The patient is watching her diet and follows a regular exercise program    She is scheduled to see her cardiologist for follow up of palpitations  She sees him in July 2021  Diet and Physical Activity  · Diet/Nutrition: heart healthy (low sodium) diet, limited junk food, low carb diet and consuming 3-5 servings of fruits/vegetables daily  Vegetarian  · Exercise: walking, moderate cardiovascular exercise, 3-4 times a week on average and Yoga and Yard Work         Depression Screening  PHQ-9 Depression Screening    PHQ-9:   Frequency of the following problems over the past two weeks:      Little interest or pleasure in doing things: 0 - not at all  Feeling down, depressed, or hopeless: 0 - not at all  PHQ-2 Score: 0       General Health  · Sleep: sleeps poorly  Because of her   · Hearing: normal - bilateral   · Vision: no vision problems, goes for regular eye exams, most recent eye exam <1 year ago and wears contacts     · Dental: regular dental visits and brushes teeth twice daily  /GYN Health  · Patient is: postmenopausal  · Sees her GYN yearly  Review of Systems:     Review of Systems   Constitutional: Negative  HENT: Negative  Eyes: Negative  Respiratory: Negative  Cardiovascular: Negative  Gastrointestinal: Negative  Endocrine: Negative  Genitourinary: Negative  Musculoskeletal: Negative  Skin: Negative  Allergic/Immunologic: Negative  Neurological: Negative  Hematological: Negative  Psychiatric/Behavioral: Negative  Past Medical History:     Past Medical History:   Diagnosis Date    Asymptomatic bacteriuria     Last Assessed: 4/8/2013     Contusion of left chest wall     Last Assessed: 10/20/2015     Elevated ALT measurement     Last Assessed: 4/8/2013    GERD (gastroesophageal reflux disease)     Irritable bowel syndrome     Right lateral epicondylitis     Last Assessed: 11/8/2013       Past Surgical History:     Past Surgical History:   Procedure Laterality Date    BREAST BIOPSY Left 1994    EXCISIONAL BIOPSY BENIGN    COLONOSCOPY  04/03/2012    normal mucosa throughout the whole colon, grade 2 internal hemorrhoids, 10 year recall April 2022     NO PAST SURGERIES        Social History:        Social History     Socioeconomic History    Marital status: /Civil Union     Spouse name: None    Number of children: None    Years of education: None    Highest education level: None   Occupational History    None   Social Needs    Financial resource strain: Not hard at all   Cherry-My insecurity     Worry: Never true     Inability: Never true    Transportation needs     Medical: No     Non-medical: No   Tobacco Use    Smoking status: Former Smoker    Smokeless tobacco: Never Used    Tobacco comment: Per [employer]: Former Smoker - remote history  Substance and Sexual Activity    Alcohol use:  Yes     Alcohol/week: 7 0 standard drinks     Types: 7 Glasses of wine per week     Frequency: 4 or more times a week     Drinks per session: 1 or 2     Comment: social, Occasional Alcohol Use      Drug use: No    Sexual activity: None   Lifestyle    Physical activity     Days per week: 7 days     Minutes per session: 120 min    Stress: Only a little   Relationships    Social connections     Talks on phone: None     Gets together: None     Attends Zoroastrian service: None     Active member of club or organization: None     Attends meetings of clubs or organizations: None     Relationship status:     Intimate partner violence     Fear of current or ex partner: No     Emotionally abused: No     Physically abused: No     Forced sexual activity: No   Other Topics Concern    None   Social History Narrative    Consumes a vegan diet         Retired      Family History:     Family History   Problem Relation Age of Onset    Fibromyalgia Mother     Leukemia Mother     Cervical cancer Maternal Grandmother     Colon cancer Paternal Grandmother     Breast cancer Cousin 40      Current Medications:     Current Outpatient Medications   Medication Sig Dispense Refill    Multiple Vitamins-Minerals (PreserVision AREDS) TABS Take by mouth 2 (two) times a day      Turmeric 500 MG CAPS Take 1 capsule by mouth 2 (two) times a day      Cholecalciferol (VITAMIN D PO) Take by mouth       No current facility-administered medications for this visit  Allergies: Allergies   Allergen Reactions    Naproxen      Annotation - [id number]: The patient felt disoriented    Sulfa Antibiotics Rash      Physical Exam:     /78   Pulse 68   Temp (!) 97 1 °F (36 2 °C) (Tympanic)   Ht 5' 10" (1 778 m)   Wt 62 6 kg (138 lb)   LMP  (LMP Unknown)   SpO2 99%   BMI 19 80 kg/m²     Physical Exam  Constitutional:       General: She is not in acute distress  Appearance: She is well-developed  She is not diaphoretic  HENT:      Head: Normocephalic and atraumatic        Right Ear: External ear normal       Left Ear: External ear normal       Nose: Nose normal       Mouth/Throat:      Pharynx: No oropharyngeal exudate  Eyes:      General: No scleral icterus  Right eye: No discharge  Left eye: No discharge  Pupils: Pupils are equal, round, and reactive to light  Neck:      Musculoskeletal: Normal range of motion and neck supple  Thyroid: No thyromegaly  Vascular: No JVD  Trachea: No tracheal deviation  Cardiovascular:      Rate and Rhythm: Normal rate and regular rhythm  Heart sounds: Normal heart sounds  No murmur  No friction rub  No gallop  Pulmonary:      Effort: Pulmonary effort is normal  No respiratory distress  Breath sounds: Normal breath sounds  No wheezing or rales  Chest:      Chest wall: No tenderness  Abdominal:      General: Bowel sounds are normal  There is no distension  Palpations: Abdomen is soft  There is no mass  Tenderness: There is no abdominal tenderness  There is no guarding or rebound  Hernia: No hernia is present  Musculoskeletal: Normal range of motion  General: No tenderness or deformity  Lymphadenopathy:      Cervical: No cervical adenopathy  Skin:     General: Skin is warm and dry  Coloration: Skin is not pale  Findings: No erythema or rash  Neurological:      Mental Status: She is alert and oriented to person, place, and time  Cranial Nerves: No cranial nerve deficit  Sensory: No sensory deficit  Motor: No abnormal muscle tone  Coordination: Coordination normal       Deep Tendon Reflexes: Reflexes normal    Psychiatric:         Behavior: Behavior normal          Thought Content:  Thought content normal           Nikhil Rucker DO  [address]

## 2021-04-19 NOTE — PATIENT INSTRUCTIONS

## 2021-06-14 DIAGNOSIS — Z12.4 SCREENING FOR CERVICAL CANCER: Primary | ICD-10-CM

## 2021-06-22 ENCOUNTER — EVALUATION (OUTPATIENT)
Dept: PHYSICAL THERAPY | Facility: CLINIC | Age: 61
End: 2021-06-22
Payer: COMMERCIAL

## 2021-06-22 DIAGNOSIS — N39.46 MIXED STRESS AND URGE URINARY INCONTINENCE: Primary | ICD-10-CM

## 2021-06-22 DIAGNOSIS — M62.89 PELVIC FLOOR DYSFUNCTION: ICD-10-CM

## 2021-06-22 DIAGNOSIS — N94.10 DYSPAREUNIA IN FEMALE: ICD-10-CM

## 2021-06-22 PROCEDURE — 97530 THERAPEUTIC ACTIVITIES: CPT | Performed by: PHYSICAL THERAPIST

## 2021-06-22 PROCEDURE — 97161 PT EVAL LOW COMPLEX 20 MIN: CPT | Performed by: PHYSICAL THERAPIST

## 2021-06-22 NOTE — LETTER
2021    Nick Lee  [facility]    Patient: Vy Franklin   YOB: 1960   Date of Visit: 2021     Encounter Diagnosis     ICD-10-CM    1  Mixed stress and urge urinary incontinence  N39 46    2  Pelvic floor dysfunction  M62 89    3  Dyspareunia in female  N94 10        Dear Dr Aisha Francois: Thank you for your recent referral of Vy Franklin  Please review the attached evaluation summary from Vera's recent visit  Please verify that you agree with the plan of care by signing the attached order  If you have any questions or concerns, please do not hesitate to call  I sincerely appreciate the opportunity to share in the care of one of your patients and hope to have another opportunity to work with you in the near future  Sincerely,    Kale Lincoln, PT      Referring Provider:      I certify that I have read the below Plan of Care and certify the need for these services furnished under this plan of treatment while under my care  Nick [phone]  Via Fax: [phone]          PT Evaluation     Today's date: 2021  Patient name: Vy Franklin  : 1960  MRN: [id number]  Referring provider: Adele Fields  Dx:   Encounter Diagnosis     ICD-10-CM    1  Mixed stress and urge urinary incontinence  N39 46    2  Pelvic floor dysfunction  M62 89    3  Dyspareunia in female  N94 10                   Assessment  Assessment details: Vy Franklin is a 64 y o  female who presents with concerns of urinary urge and stress incontinence and dyspareunia  Demonstrates severe ttp during vaginal assessment, decrease PFM strength/endurance and incomplete delayed PFM relaxation  Instructed pt in urge suppression technique and provided voiding dairy and list of bladder irritants  Instructed pt in diaphragmatic breathing for PFM relaxation   Patient presents with the below outlined deficits and is appropriate for skilled physical therapy in order to address deficits and ultimately meet goal of independent self management of condition  Impairments: abnormal muscle tone, abnormal or restricted ROM, impaired physical strength, lacks appropriate home exercise program, pain with function and poor posture   Barriers to therapy: High co-pay and spouse care     Goals  Impairment Goals:  1  Decrease tenderness to palpation in PFM to minimal-moderate in 8 weeks  2  Increase PFM endurance to 5 sec for 5 reps without fatigue in 8 weeks  3  Increase PFM relaxation to 75% in 3 seconds in 8 weeks    Functional Goals:  1  Pt implemented urge suppression strategies throughout the day and reports average voiding interval is 3 hours upon discharge in order to have more functional bladder functioning  2  Patient will report 50% reduction in discomfort with vaginal palpation in order to tolerate gynecological examination  and improve quality of life  3  Patient reports 50% or greater reduction of urinary leakage with coughing and sneezing upon discharge    Plan  Patient would benefit from: women's health eval and skilled physical therapy  Planned modality interventions: biofeedback  Planned therapy interventions: manual therapy, neuromuscular re-education, patient education, self care, strengthening, stretching, home exercise program and behavior modification  Frequency: 1x week  Duration in weeks: 8  Plan of Care expiration date: 8/17/2021  Treatment plan discussed with: patient        PT Pelvic Floor Subjective:   History of Present Illness:   65 yo female presents with c/o urinary urge and stress incontinence  Pt reports long history of pain with vaginal penetration and examinations, and paps  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     Social Support:     Lives with:  Spouse    Relationship status: /committed    Work status: retired (retired early to care for )  Diet and Exercise: Diet:vegan    Exercise type: walking    Exercise frequency: daily  OB/ gyn History    Gestational History:     Prior Pregnancy: Yes      Number of prior pregnancies: 3    Number of term pregnancies: 0    Menstrual History:      Menopausal: menopause  no hormone replacement therapy  Bladder Function:     Voiding Difficulties positive for: frequent urination      Voiding Difficulties negative for: hesitancy and straining      Voiding Difficulties comments:     Voiding frequency: every 1-2 hours    Urinary leakage: urine leakage    Urinary leakage aggravated by: coughing, sneezing, walking to the bathroom, key-in-the-door syndrome and seeing a toilet    Urinary leakage not aggravated by: bending, hearing running water, post-void dribble and anxiety    Nocturia (episodes per night): 0 and 1    Painful urination: No      Fluid Intake Type:  Coffee and water    Intake (ounces): Water: 48, Coffee: 24, Alcohol: 6  Incontinence Management:     Pads/Diaper Use:  None    Patient has attempted at least 4 weeks of independent pelvic floor strengthening exercises without a resolution of symptoms  Bowel Function:     Bowel frequency: daily    Stool softener use: no stool softeners    Enema use: no enema    Uses "squatty potty": no Squatty Potty  Sexual Function:     Sexually Active:  Not sexually active    Lubrication Use: Yes  Pain:     No pain reported by patient  Location:  Vaginal  Patient Goals:     Patient goals for therapy:  Fully empty bladder or bowels, improved pain management and improved quality of life      Objective   Pelvic Floor Exam     General Perineum Exam:   Positive for no pelvic organ prolapse at rest and perianal erythema     Negative for gaping introitus    Visual Inspection of Perineum:   Excursion of perineal body in cephalad direction with contraction of pelvic floor muscles (PFM): weak  Excursion of perineal body in caudal direction with relaxation of pelvic floor muscles (PFM): weak  Involuntary contraction with coughing: no  Involuntary relaxation with bearing down: yes  Sensation: intact  Tenderness: unprovoked    Pelvic Organ Prolapse   Position: hook-lying  At rest: none  With bearing down: mild (>1cm from hymenal remnants)  Location: anterior    Pelvic Floor Muscle Exam     Palpation   Moderate increased muscle tension in the bulbospongiosus, ischiocavernosus, super transverse perineal, puborectalis, pubococcygeus, iliococcygeus and periurethral  Minimal tenderness on right in the ischiocavernosus and periurethral  Moderate tenderness on right in the bulbospongiosus, super transverse perineal, puborectalis, pubococcygeus and iliococcygeus  Moderate tenderness on left in the ischiocavernosus and periurethral  Severe tenderness on left in the bulbospongiosus, super transverse perineal, puborectalis, pubococcygeus and iliococcygeus    Muscle Contraction: substitution and left hip adductor contraction  Breathing pattern with contraction: holding breath  Pelvic floor muscle relaxation is delayed and incomplete  25% pelvic floor relaxation  5 seconds required for complete relaxation       PERFECT Score   Power right: 2/5  Power left: 1/5  Endurance (seconds to max): 3  Repetitions (before fatigue): 3               Precautions: none  Note: severe ttp to internals, start with external manuals, PF relaxation and strengthening     Manuals 6/22            External PFM As mari  On left                                                   Neuro Re-Ed             Diaphragmatic breathing  10x            TA             PF lift                                                                 Ther Ex                                                                                                                     Ther Activity             Urge supression performed            Bladder dairy nv            Gait Training                                       Modalities

## 2021-06-22 NOTE — PROGRESS NOTES
PT Evaluation     Today's date: 2021  Patient name: Cat Hagan  : 1960  MRN: [id number]  Referring provider: Nadira Zhu  Dx:   Encounter Diagnosis     ICD-10-CM    1  Mixed stress and urge urinary incontinence  N39 46    2  Pelvic floor dysfunction  M62 89    3  Dyspareunia in female  N94 10                   Assessment  Assessment details: Cat Hagan is a 64 y o  female who presents with concerns of urinary urge and stress incontinence and dyspareunia  Demonstrates severe ttp during vaginal assessment, decrease PFM strength/endurance and incomplete delayed PFM relaxation  Instructed pt in urge suppression technique and provided voiding dairy and list of bladder irritants  Instructed pt in diaphragmatic breathing for PFM relaxation  Patient presents with the below outlined deficits and is appropriate for skilled physical therapy in order to address deficits and ultimately meet goal of independent self management of condition  Impairments: abnormal muscle tone, abnormal or restricted ROM, impaired physical strength, lacks appropriate home exercise program, pain with function and poor posture   Barriers to therapy: High co-pay and spouse care     Goals  Impairment Goals:  1  Decrease tenderness to palpation in PFM to minimal-moderate in 8 weeks  2  Increase PFM endurance to 5 sec for 5 reps without fatigue in 8 weeks  3  Increase PFM relaxation to 75% in 3 seconds in 8 weeks    Functional Goals:  1  Pt implemented urge suppression strategies throughout the day and reports average voiding interval is 3 hours upon discharge in order to have more functional bladder functioning  2  Patient will report 50% reduction in discomfort with vaginal palpation in order to tolerate gynecological examination  and improve quality of life     3  Patient reports 50% or greater reduction of urinary leakage with coughing and sneezing upon discharge    Plan  Patient would benefit from: women's health eval and skilled physical therapy  Planned modality interventions: biofeedback  Planned therapy interventions: manual therapy, neuromuscular re-education, patient education, self care, strengthening, stretching, home exercise program and behavior modification  Frequency: 1x week  Duration in weeks: 8  Plan of Care expiration date: 8/17/2021  Treatment plan discussed with: patient        PT Pelvic Floor Subjective:   History of Present Illness:   65 yo female presents with c/o urinary urge and stress incontinence  Pt reports long history of pain with vaginal penetration and examinations, and paps  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  Social Support:     Lives with:  Spouse    Relationship status: /committed    Work status: retired (retired early to care for )  Diet and Exercise:    Diet:vegan    Exercise type: walking    Exercise frequency: daily  OB/ gyn History    Gestational History:     Prior Pregnancy: Yes      Number of prior pregnancies: 3    Number of term pregnancies: 0    Menstrual History:      Menopausal: menopause  no hormone replacement therapy  Bladder Function:     Voiding Difficulties positive for: frequent urination      Voiding Difficulties negative for: hesitancy and straining      Voiding Difficulties comments:     Voiding frequency: every 1-2 hours    Urinary leakage: urine leakage    Urinary leakage aggravated by: coughing, sneezing, walking to the bathroom, key-in-the-door syndrome and seeing a toilet    Urinary leakage not aggravated by: bending, hearing running water, post-void dribble and anxiety    Nocturia (episodes per night): 0 and 1    Painful urination: No      Fluid Intake Type:  Coffee and water    Intake (ounces):  Water: 48, Coffee: 24, Alcohol: 6  Incontinence Management:     Pads/Diaper Use:  None    Patient has attempted at least 4 weeks of independent pelvic floor strengthening exercises without a resolution of symptoms  Bowel Function:     Bowel frequency: daily    Stool softener use: no stool softeners    Enema use: no enema    Uses "squatty potty": no Squatty Potty  Sexual Function:     Sexually Active:  Not sexually active    Lubrication Use: Yes  Pain:     No pain reported by patient  Location:  Vaginal  Patient Goals:     Patient goals for therapy:  Fully empty bladder or bowels, improved pain management and improved quality of life      Objective   Pelvic Floor Exam     General Perineum Exam:   Positive for no pelvic organ prolapse at rest and perianal erythema  Negative for gaping introitus    Visual Inspection of Perineum:   Excursion of perineal body in cephalad direction with contraction of pelvic floor muscles (PFM): weak  Excursion of perineal body in caudal direction with relaxation of pelvic floor muscles (PFM): weak  Involuntary contraction with coughing: no  Involuntary relaxation with bearing down: yes  Sensation: intact  Tenderness: unprovoked    Pelvic Organ Prolapse   Position: hook-lying  At rest: none  With bearing down: mild (>1cm from hymenal remnants)  Location: anterior    Pelvic Floor Muscle Exam     Palpation   Moderate increased muscle tension in the bulbospongiosus, ischiocavernosus, super transverse perineal, puborectalis, pubococcygeus, iliococcygeus and periurethral  Minimal tenderness on right in the ischiocavernosus and periurethral  Moderate tenderness on right in the bulbospongiosus, super transverse perineal, puborectalis, pubococcygeus and iliococcygeus  Moderate tenderness on left in the ischiocavernosus and periurethral  Severe tenderness on left in the bulbospongiosus, super transverse perineal, puborectalis, pubococcygeus and iliococcygeus    Muscle Contraction: substitution and left hip adductor contraction  Breathing pattern with contraction: holding breath  Pelvic floor muscle relaxation is delayed and incomplete  25% pelvic floor relaxation  5 seconds required for complete relaxation       PERFECT Score Power right: 2/5  Power left: 1/5  Endurance (seconds to max): 3  Repetitions (before fatigue): 3      Addendum 9/21/21: pt has not returned to PT since this visit  Self-discharge at this time           Precautions: none  Note: severe ttp to internals, start with external manuals, PF relaxation and strengthening     Manuals 6/22            External PFM As mari  On left                                                   Neuro Re-Ed             Diaphragmatic breathing  10x            TA             PF lift                                                                 Ther Ex                                                                                                                     Ther Activity             Urge supression performed            Bladder dairy nv            Gait Training                                       Modalities

## 2021-06-29 ENCOUNTER — TELEPHONE (OUTPATIENT)
Dept: FAMILY MEDICINE CLINIC | Facility: CLINIC | Age: 61
End: 2021-06-29

## 2021-06-29 DIAGNOSIS — R00.2 PALPITATIONS: Primary | ICD-10-CM

## 2021-06-29 NOTE — TELEPHONE ENCOUNTER
----- Message from Junior Haddad on behalf of Evelia Galan  Vandana Salcido sent at 6/29/2021 10:02 AM EDT -----  Regarding: Non-Urgent Medical Question  Contact: [phone]  This message is being sent by Junior Haddad on behalf of [facility] morning, I have an appointment with a cardiologist early Thursday morning and need a referral please  It's for a follow up visit with EKG  The doctor is Prashant Vasquez at [facility]  [address] [place], [place] [address]  [phone] and his NPI is [id number]  Thank you! Since he is not with [facility], I guess this is a piece of paper I need to pickup at the office? Is there an easier way to get it to me/him? Thanks again for your help!  Kind regards, Stephen Myrick

## 2021-06-29 NOTE — TELEPHONE ENCOUNTER
Spoke with patient, confirmed orders are for her  Called cards, used dx code of palpitations according to previous note  Patient to  later today

## 2021-08-02 ENCOUNTER — HOSPITAL ENCOUNTER (OUTPATIENT)
Dept: MAMMOGRAPHY | Facility: CLINIC | Age: 61
Discharge: HOME | End: 2021-08-02
Payer: COMMERCIAL

## 2021-08-02 VITALS — BODY MASS INDEX: 19.76 KG/M2 | WEIGHT: 138 LBS | HEIGHT: 70 IN

## 2021-08-02 DIAGNOSIS — Z12.31 ENCOUNTER FOR SCREENING MAMMOGRAM FOR MALIGNANT NEOPLASM OF BREAST: ICD-10-CM

## 2021-08-02 PROCEDURE — 77063 BREAST TOMOSYNTHESIS BI: CPT

## 2021-08-02 PROCEDURE — 77067 SCR MAMMO BI INCL CAD: CPT

## 2021-09-14 ENCOUNTER — TELEPHONE (OUTPATIENT)
Dept: OBGYN CLINIC | Facility: CLINIC | Age: 61
End: 2021-09-14

## 2021-09-14 NOTE — TELEPHONE ENCOUNTER
Patient called has "New Patient"  appointment scheduled for 11/4/21  Now c/o left breast nipple pain, on and off x 1 month but now constant, with slight swelling  No discharge  Wants appointment for breast exam   Given for 9/15/21

## 2021-09-15 ENCOUNTER — OFFICE VISIT (OUTPATIENT)
Dept: OBGYN CLINIC | Facility: CLINIC | Age: 61
End: 2021-09-15
Payer: COMMERCIAL

## 2021-09-15 VITALS
BODY MASS INDEX: 19.76 KG/M2 | WEIGHT: 138 LBS | DIASTOLIC BLOOD PRESSURE: 84 MMHG | SYSTOLIC BLOOD PRESSURE: 124 MMHG | HEIGHT: 70 IN

## 2021-09-15 DIAGNOSIS — N64.4 BREAST PAIN, LEFT: Primary | ICD-10-CM

## 2021-09-15 PROCEDURE — 1036F TOBACCO NON-USER: CPT | Performed by: OBSTETRICS & GYNECOLOGY

## 2021-09-15 PROCEDURE — 3008F BODY MASS INDEX DOCD: CPT | Performed by: OBSTETRICS & GYNECOLOGY

## 2021-09-15 PROCEDURE — 99202 OFFICE O/P NEW SF 15 MIN: CPT | Performed by: OBSTETRICS & GYNECOLOGY

## 2021-09-15 NOTE — PROGRESS NOTES
CC:    Breast pain    HPI: Edi Hanks presents for evaluation of left breast pain, localized to the nipple area without radiation, that has been there for over a month  This patient denies any nipple discharge, skin changes, trauma, and recently had a normal mammogram   Historically the patient did have a benign biopsy in this breast years ago where she had a normal mammogram but a positive ultrasound  There is no strong family history for breast cancer  Past Medical History:  Past Medical History:   Diagnosis Date    Asymptomatic bacteriuria     Last Assessed: 4/8/2013     Contusion of left chest wall     Last Assessed: 10/20/2015     Elevated ALT measurement     Last Assessed: 4/8/2013    GERD (gastroesophageal reflux disease)     Irritable bowel syndrome     Right lateral epicondylitis     Last Assessed: 11/8/2013        Past Surgical History:  Past Surgical History:   Procedure Laterality Date    BREAST BIOPSY Left 1994    EXCISIONAL BIOPSY BENIGN    COLONOSCOPY  04/03/2012    normal mucosa throughout the whole colon, grade 2 internal hemorrhoids, 10 year recall April 2022     NO PAST SURGERIES         Past OB/Gyn History:    Noncontributory    ALLERGIES:   Allergies   Allergen Reactions    Naproxen      Annotation - [id number]: The patient felt disoriented    Sulfa Antibiotics Rash       MEDS:   Current Outpatient Medications:     Cholecalciferol (VITAMIN D PO)    Multiple Vitamins-Minerals (PreserVision AREDS) TABS    Turmeric 500 MG CAPS    Review of Systems:  Skin: No rashes or discolorations of any concern  RESP: Denies SOB, no cough  CV: Denies chest pain or palpitations  Breasts: Denies masses, skin changes and nipple discharge  Has pain of the left breast   GI: Denies abdominal pain, heartburn, nausea, vomiting, changes in bowel habits  : Denies dysuria, frequency, CVA tenderness, incontinence and hematuria     Genitalia: Denies abnormal vaginal discharge, external lesions, rashes, pelvic pain, pressure, abnormal bleeding  Rectal:  Denies pain, bleeding, hemorrhoids,    Physical Exam:  /84 (BP Location: Left arm, Patient Position: Sitting, Cuff Size: Standard)   Ht 5' 10" (1 778 m)   Wt 62 6 kg (138 lb)   LMP  (LMP Unknown)   BMI 19 80 kg/m²    Gen: The patient was alert and oriented x3, pleasant well-appearing female in no acute distress  Breast:   Symmetric, without skin change, nipple discharge, nodularity, definite mass, or tenderness  No supraclavicular or axillary adenopathy  Assessment/plan:   Given the past history and the chronicity of the breast pain, patient was given a referral to have a ultrasound of the left breast   She is to call with any difficulties in scheduling of this and we will get back to her once the results are available

## 2021-09-20 ENCOUNTER — TELEPHONE (OUTPATIENT)
Dept: FAMILY MEDICINE CLINIC | Facility: CLINIC | Age: 61
End: 2021-09-20

## 2021-09-20 DIAGNOSIS — M54.30 SCIATICA, UNSPECIFIED LATERALITY: Primary | ICD-10-CM

## 2021-09-20 NOTE — TELEPHONE ENCOUNTER
Patient called  Patient has requested a referral for a chiropractor Arabella MATTHEW [phone] to treat sciatica      [facility] office [phone]    Thank you

## 2021-09-20 NOTE — PROGRESS NOTES
Per chelo Hernandez to order referral for Chiropractor - Lance Christina, for sciatica     (pt request)

## 2021-09-21 ENCOUNTER — HOSPITAL ENCOUNTER (OUTPATIENT)
Dept: ULTRASOUND IMAGING | Facility: CLINIC | Age: 61
Discharge: HOME | End: 2021-09-21
Payer: COMMERCIAL

## 2021-09-21 VITALS — WEIGHT: 138 LBS | HEIGHT: 70 IN | BODY MASS INDEX: 19.76 KG/M2

## 2021-09-21 DIAGNOSIS — N64.4 BREAST PAIN, LEFT: ICD-10-CM

## 2021-09-21 PROCEDURE — 76642 ULTRASOUND BREAST LIMITED: CPT

## 2021-10-31 ENCOUNTER — OFFICE VISIT (OUTPATIENT)
Dept: URGENT CARE | Facility: CLINIC | Age: 61
End: 2021-10-31
Payer: COMMERCIAL

## 2021-10-31 VITALS
HEART RATE: 64 BPM | WEIGHT: 135 LBS | RESPIRATION RATE: 16 BRPM | TEMPERATURE: 97.4 F | BODY MASS INDEX: 19.37 KG/M2 | OXYGEN SATURATION: 98 %

## 2021-10-31 DIAGNOSIS — Z11.59 SPECIAL SCREENING EXAMINATION FOR VIRAL DISEASE: ICD-10-CM

## 2021-10-31 DIAGNOSIS — J06.9 ACUTE URI: Primary | ICD-10-CM

## 2021-10-31 PROCEDURE — 99213 OFFICE O/P EST LOW 20 MIN: CPT | Performed by: PHYSICIAN ASSISTANT

## 2021-10-31 PROCEDURE — U0005 INFEC AGEN DETEC AMPLI PROBE: HCPCS | Performed by: PHYSICIAN ASSISTANT

## 2021-10-31 PROCEDURE — U0003 INFECTIOUS AGENT DETECTION BY NUCLEIC ACID (DNA OR RNA); SEVERE ACUTE RESPIRATORY SYNDROME CORONAVIRUS 2 (SARS-COV-2) (CORONAVIRUS DISEASE [COVID-19]), AMPLIFIED PROBE TECHNIQUE, MAKING USE OF HIGH THROUGHPUT TECHNOLOGIES AS DESCRIBED BY CMS-2020-01-R: HCPCS | Performed by: PHYSICIAN ASSISTANT

## 2021-11-01 LAB — SARS-COV-2 RNA RESP QL NAA+PROBE: NEGATIVE

## 2021-11-04 ENCOUNTER — OFFICE VISIT (OUTPATIENT)
Dept: OBGYN CLINIC | Facility: CLINIC | Age: 61
End: 2021-11-04
Payer: COMMERCIAL

## 2021-11-04 VITALS
BODY MASS INDEX: 19.81 KG/M2 | DIASTOLIC BLOOD PRESSURE: 72 MMHG | HEIGHT: 70 IN | WEIGHT: 138.4 LBS | SYSTOLIC BLOOD PRESSURE: 126 MMHG

## 2021-11-04 DIAGNOSIS — Z12.4 CERVICAL CANCER SCREENING: Primary | ICD-10-CM

## 2021-11-04 DIAGNOSIS — Z12.31 ENCOUNTER FOR SCREENING MAMMOGRAM FOR BREAST CANCER: ICD-10-CM

## 2021-11-04 DIAGNOSIS — R87.619 ATYPICAL GLANDULAR CELLS OF UNDETERMINED SIGNIFICANCE (AGUS) ON CERVICAL PAP SMEAR: ICD-10-CM

## 2021-11-04 DIAGNOSIS — N39.3 STRESS INCONTINENCE: ICD-10-CM

## 2021-11-04 DIAGNOSIS — R87.610 ASCUS OF CERVIX WITH NEGATIVE HIGH RISK HPV: ICD-10-CM

## 2021-11-04 DIAGNOSIS — Z11.51 SCREENING FOR HPV (HUMAN PAPILLOMAVIRUS): ICD-10-CM

## 2021-11-04 DIAGNOSIS — Z01.419 ENCOUNTER FOR ANNUAL ROUTINE GYNECOLOGICAL EXAMINATION: ICD-10-CM

## 2021-11-04 DIAGNOSIS — N39.41 URGENCY INCONTINENCE: ICD-10-CM

## 2021-11-04 PROCEDURE — 99396 PREV VISIT EST AGE 40-64: CPT | Performed by: OBSTETRICS & GYNECOLOGY

## 2021-11-04 PROCEDURE — 3008F BODY MASS INDEX DOCD: CPT | Performed by: OBSTETRICS & GYNECOLOGY

## 2021-11-04 PROCEDURE — 1036F TOBACCO NON-USER: CPT | Performed by: OBSTETRICS & GYNECOLOGY

## 2021-11-04 RX ORDER — CHOLECALCIFEROL (VITAMIN D3) 25 MCG
TABLET,CHEWABLE ORAL
COMMUNITY

## 2021-11-04 RX ORDER — BACILLUS COAGULANS/INULIN 1B-250 MG
CAPSULE ORAL
COMMUNITY

## 2021-11-04 RX ORDER — AMOXICILLIN 500 MG
CAPSULE ORAL
COMMUNITY

## 2021-11-10 LAB
CYTOLOGIST CVX/VAG CYTO: NORMAL
DX ICD CODE: NORMAL
HPV I/H RISK 4 DNA CVX QL PROBE+SIG AMP: NEGATIVE
OTHER STN SPEC: NORMAL
PATH REPORT.FINAL DX SPEC: NORMAL
SL AMB NOTE:: NORMAL
SL AMB SPECIMEN ADEQUACY: NORMAL
SL AMB TEST METHODOLOGY: NORMAL

## 2021-11-19 LAB — HPV I/H RISK 4 DNA CVX QL PROBE+SIG AMP: NEGATIVE

## 2021-11-23 DIAGNOSIS — E78.2 MIXED HYPERLIPIDEMIA: ICD-10-CM

## 2021-11-23 DIAGNOSIS — Z13.1 SCREENING FOR DIABETES MELLITUS: ICD-10-CM

## 2021-11-23 DIAGNOSIS — E55.9 VITAMIN D INSUFFICIENCY: Primary | ICD-10-CM

## 2021-11-23 DIAGNOSIS — E83.52 HYPERCALCEMIA: ICD-10-CM

## 2021-11-23 DIAGNOSIS — Z13.6 SCREENING FOR CARDIOVASCULAR CONDITION: ICD-10-CM

## 2021-11-25 LAB — HEMOCCULT STL QL IA: NEGATIVE

## 2021-12-13 LAB
25(OH)D3+25(OH)D2 SERPL-MCNC: 81.5 NG/ML (ref 30–100)
ALBUMIN SERPL ELPH-MCNC: 4.4 G/DL (ref 2.9–4.4)
ALBUMIN SERPL-MCNC: 4.9 G/DL (ref 3.8–4.8)
ALBUMIN/GLOB SERPL: 1.5 {RATIO} (ref 0.7–1.7)
ALBUMIN/GLOB SERPL: 2 {RATIO} (ref 1.2–2.2)
ALP SERPL-CCNC: 105 IU/L (ref 44–121)
ALPHA1 GLOB SERPL ELPH-MCNC: 0.2 G/DL (ref 0–0.4)
ALPHA2 GLOB SERPL ELPH-MCNC: 0.7 G/DL (ref 0.4–1)
ALT SERPL-CCNC: 19 IU/L (ref 0–32)
APPEARANCE UR: CLEAR
AST SERPL-CCNC: 22 IU/L (ref 0–40)
B-GLOBULIN SERPL ELPH-MCNC: 1 G/DL (ref 0.7–1.3)
BASOPHILS # BLD AUTO: 0 X10E3/UL (ref 0–0.2)
BASOPHILS NFR BLD AUTO: 1 %
BILIRUB SERPL-MCNC: 0.4 MG/DL (ref 0–1.2)
BILIRUB UR QL STRIP: NEGATIVE
BUN SERPL-MCNC: 13 MG/DL (ref 8–27)
BUN/CREAT SERPL: 19 (ref 12–28)
CA-I SERPL ISE-MCNC: 5.1 MG/DL (ref 4.5–5.6)
CALCIUM SERPL-MCNC: 10.5 MG/DL (ref 8.7–10.3)
CHLORIDE SERPL-SCNC: 102 MMOL/L (ref 96–106)
CHOLEST SERPL-MCNC: 228 MG/DL (ref 100–199)
CHOLEST/HDLC SERPL: 1.5 RATIO (ref 0–4.4)
CO2 SERPL-SCNC: 27 MMOL/L (ref 20–29)
COLOR UR: YELLOW
CREAT SERPL-MCNC: 0.67 MG/DL (ref 0.57–1)
EOSINOPHIL # BLD AUTO: 0.1 X10E3/UL (ref 0–0.4)
EOSINOPHIL NFR BLD AUTO: 1 %
ERYTHROCYTE [DISTWIDTH] IN BLOOD BY AUTOMATED COUNT: 12.8 % (ref 11.7–15.4)
GAMMA GLOB SERPL ELPH-MCNC: 1 G/DL (ref 0.4–1.8)
GLOBULIN SER CALC-MCNC: 2.9 G/DL (ref 2.2–3.9)
GLOBULIN SER-MCNC: 2.4 G/DL (ref 1.5–4.5)
GLUCOSE SERPL-MCNC: 96 MG/DL (ref 65–99)
GLUCOSE UR QL: NEGATIVE
HCT VFR BLD AUTO: 42.4 % (ref 34–46.6)
HDLC SERPL-MCNC: 155 MG/DL
HGB BLD-MCNC: 14.1 G/DL (ref 11.1–15.9)
HGB UR QL STRIP: NEGATIVE
IMM GRANULOCYTES # BLD: 0 X10E3/UL (ref 0–0.1)
IMM GRANULOCYTES NFR BLD: 0 %
KETONES UR QL STRIP: NEGATIVE
LABORATORY COMMENT REPORT: NORMAL
LDLC SERPL CALC-MCNC: 66 MG/DL (ref 0–99)
LDLC SERPL DIRECT ASSAY-MCNC: 54 MG/DL (ref 0–99)
LEUKOCYTE ESTERASE UR QL STRIP: NEGATIVE
LYMPHOCYTES # BLD AUTO: 2 X10E3/UL (ref 0.7–3.1)
LYMPHOCYTES NFR BLD AUTO: 36 %
M PROTEIN SERPL ELPH-MCNC: NORMAL G/DL
MCH RBC QN AUTO: 32.4 PG (ref 26.6–33)
MCHC RBC AUTO-ENTMCNC: 33.3 G/DL (ref 31.5–35.7)
MCV RBC AUTO: 98 FL (ref 79–97)
MICRO URNS: NORMAL
MONOCYTES # BLD AUTO: 0.4 X10E3/UL (ref 0.1–0.9)
MONOCYTES NFR BLD AUTO: 7 %
NEUTROPHILS # BLD AUTO: 3 X10E3/UL (ref 1.4–7)
NEUTROPHILS NFR BLD AUTO: 55 %
NITRITE UR QL STRIP: NEGATIVE
PH UR STRIP: 5.5 [PH] (ref 5–7.5)
PLATELET # BLD AUTO: 342 X10E3/UL (ref 150–450)
POTASSIUM SERPL-SCNC: 4.2 MMOL/L (ref 3.5–5.2)
PROT SERPL-MCNC: 7.3 G/DL (ref 6–8.5)
PROT UR QL STRIP: NEGATIVE
PTH-INTACT SERPL-MCNC: 32 PG/ML (ref 15–65)
RBC # BLD AUTO: 4.35 X10E6/UL (ref 3.77–5.28)
SL AMB EGFR AFRICAN AMERICAN: 110 ML/MIN/1.73
SL AMB EGFR NON AFRICAN AMERICAN: 95 ML/MIN/1.73
SL AMB VLDL CHOLESTEROL CALC: 7 MG/DL (ref 5–40)
SODIUM SERPL-SCNC: 142 MMOL/L (ref 134–144)
SP GR UR: 1.01 (ref 1–1.03)
TRIGL SERPL-MCNC: 39 MG/DL (ref 0–149)
TSH SERPL DL<=0.005 MIU/L-ACNC: 2.59 UIU/ML (ref 0.45–4.5)
UROBILINOGEN UR STRIP-ACNC: 0.2 MG/DL (ref 0.2–1)
WBC # BLD AUTO: 5.6 X10E3/UL (ref 3.4–10.8)

## 2022-04-15 DIAGNOSIS — Z12.83 SCREENING FOR MALIGNANT NEOPLASM OF SKIN: Primary | ICD-10-CM

## 2022-04-21 ENCOUNTER — OFFICE VISIT (OUTPATIENT)
Dept: FAMILY MEDICINE CLINIC | Facility: CLINIC | Age: 62
End: 2022-04-21
Payer: COMMERCIAL

## 2022-04-21 VITALS
RESPIRATION RATE: 17 BRPM | HEIGHT: 70 IN | DIASTOLIC BLOOD PRESSURE: 70 MMHG | OXYGEN SATURATION: 100 % | SYSTOLIC BLOOD PRESSURE: 118 MMHG | HEART RATE: 61 BPM | WEIGHT: 135.6 LBS | BODY MASS INDEX: 19.41 KG/M2 | TEMPERATURE: 97.6 F

## 2022-04-21 DIAGNOSIS — Z13.1 SCREENING FOR DIABETES MELLITUS: ICD-10-CM

## 2022-04-21 DIAGNOSIS — Z13.6 SCREENING FOR CARDIOVASCULAR CONDITION: ICD-10-CM

## 2022-04-21 DIAGNOSIS — Z00.00 ANNUAL PHYSICAL EXAM: Primary | ICD-10-CM

## 2022-04-21 PROBLEM — F41.1 ANXIETY, GENERALIZED: Status: RESOLVED | Noted: 2017-04-04 | Resolved: 2022-04-21

## 2022-04-21 PROBLEM — E55.9 VITAMIN D INSUFFICIENCY: Status: RESOLVED | Noted: 2017-04-18 | Resolved: 2022-04-21

## 2022-04-21 PROCEDURE — 1036F TOBACCO NON-USER: CPT | Performed by: FAMILY MEDICINE

## 2022-04-21 PROCEDURE — 3725F SCREEN DEPRESSION PERFORMED: CPT | Performed by: FAMILY MEDICINE

## 2022-04-21 PROCEDURE — 99396 PREV VISIT EST AGE 40-64: CPT | Performed by: FAMILY MEDICINE

## 2022-04-21 PROCEDURE — 3008F BODY MASS INDEX DOCD: CPT | Performed by: FAMILY MEDICINE

## 2022-04-21 NOTE — PROGRESS NOTES
[facility]    NAME: Osvaldo Guzman  AGE: 64 y o  SEX: female  : 1960     DATE:2022     Assessment and Plan:     Problem List Items Addressed This Visit     None      Visit Diagnoses     Annual physical exam    -  Primary    Screening for cardiovascular condition        Relevant Orders    Comprehensive metabolic panel (Completed)    Screening for diabetes mellitus        Relevant Orders    Comprehensive metabolic panel (Completed)      The patient will go for the lab work as ordered to follow-up on the elevated calcium level  She will continue with her healthy diet and exercise  We have made no changes today  We will see her back in the office as scheduled  Depression Screening Follow-up Plan: Patient's depression screening was positive with a PHQ-2 score of 2  Their PHQ-9 score was   Clinically patient does not have depression  No treatment is required  The patient is still recovering from the death of her   She has a strong support system and is involving grief counseling groups  This is helped her immensely  She does not feel that she needs medication at this time  Immunizations and preventive care screenings were discussed with patient today  Appropriate education was printed on patient's after visit summary  Counseling:  Dental Health: discussed importance of regular tooth brushing, flossing, and dental visits  Injury prevention: discussed safety/seat belts, safety helmets, smoke detectors, carbon dioxide detectors, and smoking near bedding or upholstery  · Exercise: the importance of regular exercise/physical activity was discussed  Recommend exercise 3-5 times per week for at least 30 minutes  Depression Screening and Follow-up Plan: Patient was screened for depression during today's encounter  They screened negative with a PHQ-2 score of 2          Return in about 1 year (around 4/21/2023) for Annual physical      Chief Complaint:     Chief Complaint   Patient presents with    Annual Exam     No new issues  History of Present Illness:     Adult Annual Physical   Patient here for a comprehensive physical exam  The patient reports no problems  Dhara Barbour is a 64 y o  female who presents today for a complete physical  she   has been feeling well and has no complaints today  The patient denies any chest pain, shortness of breath, or palpitations  There is no edema  There are no headaches or visual changes  There is no lightheadedness, dizziness, or fainting spells  There are no GI symptoms  The patient goes for dental exams every 6 months and sees her eye doctor  The patient is watching her diet and follows a regular exercise program    She is involved in an ALS support group  She is still dealing with the recent death of her   She feels that she is coping well and has a good support system  She sees her cardiologist every 2 years  Diet and Physical Activity  · Diet/Nutrition: well balanced diet, consuming 3-5 servings of fruits/vegetables daily and vegan diet  · Exercise: moderate cardiovascular exercise and 5-7 times a week on average  Depression Screening  PHQ-2/9 Depression Screening    Little interest or pleasure in doing things: 1 - several days  Feeling down, depressed, or hopeless: 1 - several days  PHQ-2 Score: 2  PHQ-2 Interpretation: Negative depression screen       General Health  · Sleep: sleeps well  · Hearing: normal - bilateral There is some tinnitus and slight hearing loss  There is itching in her ears  · Vision: no vision problems sees the eye doctor yearly and wears contacts  · Dental: regular dental visits and brushes teeth twice daily  /GYN Health  · Patient is: postmenopausal  · She sees Dr Artie Knight  Review of Systems:     Review of Systems   Constitutional: Negative  HENT: Negative  Eyes: Negative  Respiratory: Negative  Cardiovascular: Negative  Gastrointestinal: Negative  Endocrine: Negative  Genitourinary: Negative  Musculoskeletal: Negative  Skin: Negative  Allergic/Immunologic: Negative  Neurological: Negative  Hematological: Negative  Psychiatric/Behavioral: Negative  Past Medical History:     Past Medical History:   Diagnosis Date    Asymptomatic bacteriuria     Last Assessed: 2013     Contusion of left chest wall     Last Assessed: 10/20/2015     Elevated ALT measurement     Last Assessed: 2013    GERD (gastroesophageal reflux disease)     Irritable bowel syndrome     Right lateral epicondylitis     Last Assessed: 2013       Past Surgical History:     Past Surgical History:   Procedure Laterality Date    BREAST BIOPSY Left     EXCISIONAL BIOPSY BENIGN    COLONOSCOPY  2012    normal mucosa throughout the whole colon, grade 2 internal hemorrhoids, 10 year recall 2022     NO PAST SURGERIES        Social History:     Social History     Socioeconomic History    Marital status:      Spouse name: None    Number of children: None    Years of education: None    Highest education level: None   Occupational History    None   Tobacco Use    Smoking status: Former Smoker     Packs/day: 1 00     Years: 5 00     Pack years: 5 00     Types: Cigarettes     Start date: 1978     Quit date: 1983     Years since quittin 3    Smokeless tobacco: Never Used    Tobacco comment: Per [employer]: Former Smoker - remote history  Vaping Use    Vaping Use: Never used   Substance and Sexual Activity    Alcohol use:  Yes     Alcohol/week: 7 0 standard drinks     Types: 7 Glasses of wine per week     Comment: social, Occasional Alcohol Use      Drug use: No    Sexual activity: Not Currently     Partners: Male     Birth control/protection: None   Other Topics Concern    None   Social History Narrative    Consumes a vegan diet     Retired     Social Determinants of Health     Financial Resource Strain: Not on [employer] Insecurity: Not on file   Transportation Needs: Not on file   Physical Activity: Not on file   Stress: Not on file   Social Connections: Not on file   Intimate Partner Violence: Not At Risk    Fear of Current or Ex-Partner: No    Emotionally Abused: No    Physically Abused: No    Sexually Abused: No   Housing Stability: Not on file      Family History:     Family History   Problem Relation Age of Onset    Fibromyalgia Mother     Leukemia Mother     Arthritis Mother     Cervical cancer Maternal Grandmother     Cancer Maternal Grandmother     Colon cancer Paternal Grandmother     Cancer Paternal Grandmother     Breast cancer Cousin 36    Diabetes Father     Breast cancer Cousin 45      Current Medications:     Current Outpatient Medications   Medication Sig Dispense Refill    Bacillus Coagulans-Inulin (Probiotic) 1-250 BILLION-MG CAPS       Cholecalciferol (Vitamin D3) 50 MCG (2000 UT) CHEW Chew      Cyanocobalamin (B-12) 1000 MCG CAPS       L-Tryptophan 500 MG CAPS       Lysine 1000 MG TABS       Multiple Vitamins-Minerals (PRESERVISION AREDS PO) Take by mouth      NON FORMULARY omega 3 plant based algae oil      TURMERIC PO Take by mouth       No current facility-administered medications for this visit  Allergies: Allergies   Allergen Reactions    Naproxen      Annotation - [id number]: The patient felt disoriented    Sulfa Antibiotics Rash      Physical Exam:     /70 (BP Location: Left arm, Patient Position: Sitting, Cuff Size: Large)   Pulse 61   Temp 97 6 °F (36 4 °C) (Tympanic)   Resp 17   Ht 5' 10" (1 778 m)   Wt 61 5 kg (135 lb 9 6 oz)   LMP  (LMP Unknown)   SpO2 100%   BMI 19 46 kg/m²     Physical Exam  Constitutional:       General: She is not in acute distress  Appearance: She is well-developed  She is not diaphoretic     HENT:      Head: Normocephalic and atraumatic  Right Ear: External ear normal       Left Ear: External ear normal       Nose: Nose normal       Mouth/Throat:      Pharynx: No oropharyngeal exudate  Eyes:      General: No scleral icterus  Right eye: No discharge  Left eye: No discharge  Pupils: Pupils are equal, round, and reactive to light  Neck:      Thyroid: No thyromegaly  Vascular: No JVD  Trachea: No tracheal deviation  Cardiovascular:      Rate and Rhythm: Normal rate and regular rhythm  Heart sounds: Normal heart sounds  No murmur heard  No friction rub  No gallop  Pulmonary:      Effort: Pulmonary effort is normal  No respiratory distress  Breath sounds: Normal breath sounds  No wheezing or rales  Chest:      Chest wall: No tenderness  Abdominal:      General: Bowel sounds are normal  There is no distension  Palpations: Abdomen is soft  There is no mass  Tenderness: There is no abdominal tenderness  There is no guarding or rebound  Hernia: No hernia is present  Musculoskeletal:         General: No tenderness or deformity  Normal range of motion  Cervical back: Normal range of motion and neck supple  Lymphadenopathy:      Cervical: No cervical adenopathy  Skin:     General: Skin is warm and dry  Coloration: Skin is not pale  Findings: No erythema or rash  Neurological:      Mental Status: She is alert and oriented to person, place, and time  Cranial Nerves: No cranial nerve deficit  Sensory: No sensory deficit  Motor: No abnormal muscle tone  Coordination: Coordination normal       Deep Tendon Reflexes: Reflexes normal    Psychiatric:         Behavior: Behavior normal          Thought Content:  Thought content normal           Peyton Lin DO  [address]

## 2022-04-21 NOTE — PATIENT INSTRUCTIONS

## 2022-04-22 ENCOUNTER — TELEPHONE (OUTPATIENT)
Dept: GASTROENTEROLOGY | Facility: CLINIC | Age: 62
End: 2022-04-22

## 2022-04-23 ENCOUNTER — PATIENT MESSAGE (OUTPATIENT)
Dept: FAMILY MEDICINE CLINIC | Facility: CLINIC | Age: 62
End: 2022-04-23

## 2022-04-23 DIAGNOSIS — E87.5 SERUM POTASSIUM ELEVATED: ICD-10-CM

## 2022-04-23 DIAGNOSIS — Z78.9 VEGAN DIET: Primary | ICD-10-CM

## 2022-04-23 LAB
ALBUMIN SERPL-MCNC: 4.9 G/DL (ref 3.8–4.8)
ALBUMIN/GLOB SERPL: 2.3 {RATIO} (ref 1.2–2.2)
ALP SERPL-CCNC: 94 IU/L (ref 44–121)
ALT SERPL-CCNC: 18 IU/L (ref 0–32)
AST SERPL-CCNC: 26 IU/L (ref 0–40)
BILIRUB SERPL-MCNC: 0.3 MG/DL (ref 0–1.2)
BUN SERPL-MCNC: 16 MG/DL (ref 8–27)
BUN/CREAT SERPL: 24 (ref 12–28)
CALCIUM SERPL-MCNC: 10.3 MG/DL (ref 8.7–10.3)
CHLORIDE SERPL-SCNC: 107 MMOL/L (ref 96–106)
CO2 SERPL-SCNC: 24 MMOL/L (ref 20–29)
CREAT SERPL-MCNC: 0.66 MG/DL (ref 0.57–1)
EGFR: 100 ML/MIN/1.73
GLOBULIN SER-MCNC: 2.1 G/DL (ref 1.5–4.5)
GLUCOSE SERPL-MCNC: 85 MG/DL (ref 65–99)
POTASSIUM SERPL-SCNC: 5.2 MMOL/L (ref 3.5–5.2)
PROT SERPL-MCNC: 7 G/DL (ref 6–8.5)
SODIUM SERPL-SCNC: 144 MMOL/L (ref 134–144)

## 2022-04-27 NOTE — TELEPHONE ENCOUNTER
From: Sophia Mcclain, DO  To: Dhara Barbour  Sent: 4/23/2022 1:42 PM EDT  Subject: Labs    Hi Vera,  The repeat Complete metabolic Panel looked good- your calcium is normal  Please let me know if you have any questions  Continue with your healthy diet and exercise  We will see you back as scheduled  Take care      Dr Cantu Dub

## 2022-05-12 ENCOUNTER — TELEPHONE (OUTPATIENT)
Dept: GASTROENTEROLOGY | Facility: CLINIC | Age: 62
End: 2022-05-12

## 2022-05-12 NOTE — TELEPHONE ENCOUNTER
05/12/22  Screened by: Frank Marin    Referring Provider  Pre- Screening: There is no height or weight on file to calculate BMI  Has patient been referred for a routine screening Colonoscopy? no  Is the patient between 39-70 years old? yes      Previous Colonoscopy yes   If yes:    Date: 04/03/2012    Facility:[facility]    Reason: screening      SCHEDULING STAFF: If the patient is between 45yrs-49yrs, please advise patient to confirm benefits/coverage with their insurance company for a routine screening colonoscopy, some insurance carriers will only cover at [address] or older  If the patient is over 66years old, please schedule an office visit  Does the patient want to see a Gastroenterologist prior to their procedure OR are they having any GI symptoms? no    Has the patient been hospitalized or had abdominal surgery in the past 6 months? no    Does the patient use supplemental oxygen? no    Does the patient take Coumadin, Lovenox, Plavix, Elliquis, Xarelto, or other blood thinning medication? no    Has the patient had a stroke, cardiac event, or stent placed in the past year? no    SCHEDULING STAFF: If patient answers NO to above questions, then schedule procedure  If patient answers YES to above questions, then schedule office appointment  If patient is between 45yrs - 49yrs, please advise patient that we will have to confirm benefits & coverage with their insurance company for a routine screening colonoscopy

## 2022-06-08 ENCOUNTER — APPOINTMENT (OUTPATIENT)
Dept: RADIOLOGY | Facility: CLINIC | Age: 62
End: 2022-06-08
Payer: COMMERCIAL

## 2022-06-08 ENCOUNTER — OFFICE VISIT (OUTPATIENT)
Dept: FAMILY MEDICINE CLINIC | Facility: CLINIC | Age: 62
End: 2022-06-08
Payer: COMMERCIAL

## 2022-06-08 VITALS
TEMPERATURE: 99 F | BODY MASS INDEX: 19.56 KG/M2 | HEART RATE: 70 BPM | WEIGHT: 136.6 LBS | OXYGEN SATURATION: 97 % | SYSTOLIC BLOOD PRESSURE: 114 MMHG | HEIGHT: 70 IN | DIASTOLIC BLOOD PRESSURE: 72 MMHG | RESPIRATION RATE: 20 BRPM

## 2022-06-08 DIAGNOSIS — M54.42 CHRONIC RIGHT-SIDED LOW BACK PAIN WITH BILATERAL SCIATICA: ICD-10-CM

## 2022-06-08 DIAGNOSIS — G57.03 BILATERAL PIRIFORMIS SYNDROME: ICD-10-CM

## 2022-06-08 DIAGNOSIS — G89.29 CHRONIC RIGHT-SIDED LOW BACK PAIN WITH BILATERAL SCIATICA: ICD-10-CM

## 2022-06-08 DIAGNOSIS — G57.03 BILATERAL PIRIFORMIS SYNDROME: Primary | ICD-10-CM

## 2022-06-08 DIAGNOSIS — M54.41 CHRONIC RIGHT-SIDED LOW BACK PAIN WITH BILATERAL SCIATICA: ICD-10-CM

## 2022-06-08 PROCEDURE — 1036F TOBACCO NON-USER: CPT | Performed by: FAMILY MEDICINE

## 2022-06-08 PROCEDURE — 72110 X-RAY EXAM L-2 SPINE 4/>VWS: CPT

## 2022-06-08 PROCEDURE — 3725F SCREEN DEPRESSION PERFORMED: CPT | Performed by: FAMILY MEDICINE

## 2022-06-08 PROCEDURE — 3008F BODY MASS INDEX DOCD: CPT | Performed by: FAMILY MEDICINE

## 2022-06-08 PROCEDURE — 99213 OFFICE O/P EST LOW 20 MIN: CPT | Performed by: FAMILY MEDICINE

## 2022-06-08 RX ORDER — METHYLPREDNISOLONE 4 MG/1
TABLET ORAL
Qty: 21 EACH | Refills: 0 | Status: SHIPPED | OUTPATIENT
Start: 2022-06-08 | End: 2022-07-26 | Stop reason: HOSPADM

## 2022-06-08 NOTE — PROGRESS NOTES
Assessment/Plan:  Problem List Items Addressed This Visit    None     Visit Diagnoses     Bilateral piriformis syndrome    -  Primary    Relevant Medications    methylPREDNISolone 4 MG tablet therapy pack    Other Relevant Orders    XR spine lumbar minimum 4 views non injury (Completed)    Ambulatory Referral to Physical Therapy    Chronic right-sided low back pain with bilateral sciatica        Relevant Medications    methylPREDNISolone 4 MG tablet therapy pack    Other Relevant Orders    XR spine lumbar minimum 4 views non injury (Completed)    Ambulatory Referral to Physical Therapy      I suspect that the patient could have bilateral piriformis syndrome as well as increasing pain from her low back and bilateral sciatica  We will treat her with the Medrol Dosepak as ordered and will check an x-ray of her lumbar spine  I have referred her to physical therapy for further evaluation and treatment  She will alternate between ice and heat to the area is due as well as try regular stretching exercises  We will follow up closely with the results of her x-ray  Return if symptoms worsen or fail to improve  I spent 15 minutes during the visit reviewing the history from the patient, performing the examination, discussing the findings with the patient, providing counseling and education, and making a plan  I spent 10 minutes ordering referrals and testing and documenting  Subjective:   Chief Complaint   Patient presents with    Lower back pain     Patient reports she's been having pain in her lower back when she sits for about a year - notes that she has to take advil & tylenol to sit for long periods    Finger check     Patient reports that she broke her right middle finger as a kid  Notes recent swelling and pain  Patient ID: Lily Matt is a 58 y o  female presents today for evaluation of low back pain    Lily Matt is a 58 y o  female presents today with worsening pain in her lower back with radiation to both gluteal areas and posterior thighs  The patient has worsening lower back pain with radiation to the gluteal area and mid thigh for 5 months  It hurts to sit and she has to take ibuprofen for relief  There is no numbness  She saw the chiropractor and hgave her stretches to do with relief  There is no loss of bowel or bladder function  There is no trouble walking  There is no radiation to the feet  There is tightness in the area when it flares up- it is worse with bending over  It started with a lot of lifting  The patient denies any paresthesias  There is no weakness in her lower extremities  She does not have any difficulty walking  Back Pain  This is a recurrent problem  The current episode started more than 1 month ago  The problem occurs constantly  The problem has been gradually worsening since onset  The pain is present in the gluteal and lumbar spine  The quality of the pain is described as aching, burning and shooting  The pain radiates to the left thigh and right thigh  The pain is at a severity of 8/10  The pain is moderate  The pain is the same all the time  The symptoms are aggravated by sitting  Pertinent negatives include no abdominal pain, bladder incontinence, bowel incontinence, chest pain, dysuria, fever, headaches, leg pain, numbness, paresis, paresthesias, pelvic pain, perianal numbness, tingling, weakness or weight loss  The treatment provided mild relief       The following portions of the patient's history were reviewed and updated as appropriate: allergies, current medications, past family history, past medical history, past social history, past surgical history and problem list   Patient Active Problem List   Diagnosis    Hyperlipidemia    Vegan diet     Past Medical History:   Diagnosis Date    Asymptomatic bacteriuria     Last Assessed: 4/8/2013     Contusion of left chest wall     Last Assessed: 10/20/2015     Elevated ALT measurement     Last Assessed: 2013    GERD (gastroesophageal reflux disease)     Irritable bowel syndrome     Right lateral epicondylitis     Last Assessed: 2013      Past Surgical History:   Procedure Laterality Date    BREAST BIOPSY Left 1994    EXCISIONAL BIOPSY BENIGN    COLONOSCOPY  2012    normal mucosa throughout the whole colon, grade 2 internal hemorrhoids, 10 year recall 2022     NO PAST SURGERIES       Allergies   Allergen Reactions    Naproxen      Annotation - [id number]: The patient felt disoriented    Sulfa Antibiotics Rash     Family History   Problem Relation Age of Onset    Fibromyalgia Mother     Leukemia Mother     Arthritis Mother     Cervical cancer Maternal Grandmother     Cancer Maternal Grandmother     Colon cancer Paternal Grandmother     Cancer Paternal Grandmother     Breast cancer Cousin 36    Diabetes Father     Breast cancer Cousin 39     Social History     Socioeconomic History    Marital status:      Spouse name: Not on file    Number of children: Not on file    Years of education: Not on file    Highest education level: Not on file   Occupational History    Not on file   Tobacco Use    Smoking status: Former Smoker     Packs/day: 1 00     Years: 5 00     Pack years: 5 00     Types: Cigarettes     Start date: 1978     Quit date: 1983     Years since quittin 4    Smokeless tobacco: Never Used    Tobacco comment: Per [employer]: Former Smoker - remote history  Vaping Use    Vaping Use: Never used   Substance and Sexual Activity    Alcohol use:  Yes     Alcohol/week: 7 0 standard drinks     Types: 7 Glasses of wine per week     Comment: social, Occasional Alcohol Use      Drug use: No    Sexual activity: Not Currently     Partners: Male     Birth control/protection: None   Other Topics Concern    Not on file   Social History Narrative    Consumes a vegan diet         Retired     Social Determinants of Health     Financial Resource Strain: Not on file   Food Insecurity: Not on file   Transportation Needs: Not on file   Physical Activity: Sufficiently Active    Days of Exercise per Week: 7 days    Minutes of Exercise per Session: 30 min   Stress: No Stress Concern Present    Feeling of Stress : Not at all   Social Connections: Not on file   Intimate Partner Violence: Not At Risk    Fear of Current or Ex-Partner: No    Emotionally Abused: No    Physically Abused: No    Sexually Abused: No   Housing Stability: Not on file     Current Outpatient Medications on File Prior to Visit   Medication Sig Dispense Refill    Bacillus Coagulans-Inulin (Probiotic) 1-250 BILLION-MG CAPS       Cholecalciferol (Vitamin D3) 50 MCG (2000 UT) CHEW Chew      Cyanocobalamin (B-12) 1000 MCG CAPS       L-Tryptophan 500 MG CAPS       Lysine 1000 MG TABS       Multiple Vitamins-Minerals (PRESERVISION AREDS PO) Take by mouth      NON FORMULARY omega 3 plant based algae oil      TURMERIC PO Take by mouth       No current facility-administered medications on file prior to visit  Review of Systems   Constitutional: Negative  Negative for fever and weight loss  HENT: Negative  Eyes: Negative  Respiratory: Negative  Cardiovascular: Negative  Negative for chest pain  Gastrointestinal: Negative  Negative for abdominal pain and bowel incontinence  Endocrine: Negative  Genitourinary: Negative  Negative for bladder incontinence, dysuria and pelvic pain  Musculoskeletal: Positive for back pain  Skin: Negative  Allergic/Immunologic: Negative  Neurological: Negative  Negative for tingling, weakness, numbness, headaches and paresthesias  Hematological: Negative  Psychiatric/Behavioral: Negative          Objective:  Vitals:    06/08/22 1415   BP: 114/72   BP Location: Left arm   Patient Position: Sitting   Cuff Size: Standard   Pulse: 70   Resp: 20   Temp: 99 °F (37 2 °C)   TempSrc: Tympanic   SpO2: 97%   Weight: 62 kg (136 lb 9 6 oz)   Height: 5' 10" (1 778 m)     Body mass index is 19 6 kg/m²  Physical Exam  Constitutional:       Appearance: She is well-developed  HENT:      Head: Normocephalic and atraumatic  Mouth/Throat:      Pharynx: No oropharyngeal exudate  Eyes:      Conjunctiva/sclera: Conjunctivae normal       Pupils: Pupils are equal, round, and reactive to light  Neck:      Thyroid: No thyromegaly  Vascular: No JVD  Trachea: No tracheal deviation  Cardiovascular:      Rate and Rhythm: Normal rate and regular rhythm  Heart sounds: Normal heart sounds  No murmur heard  No friction rub  No gallop  Pulmonary:      Effort: Pulmonary effort is normal  No respiratory distress  Breath sounds: Normal breath sounds  No stridor  No wheezing or rales  Chest:      Chest wall: No tenderness  Abdominal:      General: Bowel sounds are normal  There is no distension  Palpations: Abdomen is soft  There is no mass  Tenderness: There is no abdominal tenderness  There is no guarding or rebound  Musculoskeletal:         General: Tenderness present  No deformity or signs of injury  Cervical back: Normal range of motion  Lumbar back: Spasms, tenderness and bony tenderness present  No swelling, edema, deformity, signs of trauma or lacerations  Normal range of motion  Positive right straight leg raise test and positive left straight leg raise test  No scoliosis  Right lower leg: No edema  Left lower leg: No edema  Comments: There is positive straight leg raising bilaterally at 20°  Lymphadenopathy:      Cervical: No cervical adenopathy  Skin:     General: Skin is warm and dry  Neurological:      Mental Status: She is alert and oriented to person, place, and time  Cranial Nerves: No cranial nerve deficit  Motor: No abnormal muscle tone  Coordination: Coordination normal       Deep Tendon Reflexes: Reflexes are normal and symmetric   Reflexes normal              Depression Screening and Follow-up Plan: Patient was screened for depression during today's encounter  They screened negative with a PHQ-2 score of 0

## 2022-06-20 ENCOUNTER — EVALUATION (OUTPATIENT)
Dept: PHYSICAL THERAPY | Facility: CLINIC | Age: 62
End: 2022-06-20
Payer: COMMERCIAL

## 2022-06-20 DIAGNOSIS — M54.41 CHRONIC RIGHT-SIDED LOW BACK PAIN WITH BILATERAL SCIATICA: ICD-10-CM

## 2022-06-20 DIAGNOSIS — M54.42 CHRONIC RIGHT-SIDED LOW BACK PAIN WITH BILATERAL SCIATICA: ICD-10-CM

## 2022-06-20 DIAGNOSIS — G57.03 BILATERAL PIRIFORMIS SYNDROME: Primary | ICD-10-CM

## 2022-06-20 DIAGNOSIS — G89.29 CHRONIC RIGHT-SIDED LOW BACK PAIN WITH BILATERAL SCIATICA: ICD-10-CM

## 2022-06-20 PROCEDURE — 97110 THERAPEUTIC EXERCISES: CPT | Performed by: PHYSICAL THERAPIST

## 2022-06-20 PROCEDURE — 97161 PT EVAL LOW COMPLEX 20 MIN: CPT | Performed by: PHYSICAL THERAPIST

## 2022-06-20 NOTE — PROGRESS NOTES
PT Evaluation     Today's date: 2022  Patient name: Sherrie Burrell  : 1960  MRN: [id number]  Referring provider: Yola Kaba DO  Dx:   Encounter Diagnosis     ICD-10-CM    1  Bilateral piriformis syndrome  G57 03 Ambulatory Referral to Physical Therapy   2  Chronic right-sided low back pain with bilateral sciatica  M54 41 Ambulatory Referral to Physical Therapy    M54 42     G89 29                   Assessment  Assessment details: Sherrie Burrell is a 58 y o  female who presents with pain, decreased strength and postural dysfunction  Due to these impairments, patient has difficulty performing a/iadls  Patient's clinical presentation is consistent with their referring diagnosis of Bilateral piriformis syndrome, bilateral sciatica  Patient has been educated in home exercise program and plan of care  Patient would benefit from skilled physical therapy services to address their aforementioned functional limitations and progress towards prior level of function and independence with home exercise program    Impairments: activity intolerance, impaired physical strength, lacks appropriate home exercise program, poor posture  and poor body mechanics    Symptom irritability: moderateUnderstanding of Dx/Px/POC: good   Prognosis: good    Goals  Short Term Goals: Target Date 4 weeks  1  Pt will initiate and advance HEP  2  Pt will have < 3/10 pain  4  Pt will be able to sit >15 with out difficulty    Long Term Goals: Target Date 8 weeks  1  Pt will demonstrate independence in HEP  2  Pt will have <1/10 pain  3  Pt will have full core and B LE strength  4   Pt will be able to sit unlimited amount with out difficulty        Plan  Patient would benefit from: skilled PT  Planned modality interventions: cryotherapy, electrical stimulation/Russian stimulation and thermotherapy: hydrocollator packs  Planned therapy interventions: joint mobilization, manual therapy, patient education, postural training, activity modification, abdominal trunk stabilization, body mechanics training, flexibility, functional ROM exercises, graded exercise, home exercise program, neuromuscular re-education, strengthening, stretching, therapeutic activities, therapeutic exercise, motor coordination training, muscle pump exercises, gait training and ADL training  Frequency: 2x month  Duration in weeks: 8  Plan of Care beginning date: 2022  Plan of Care expiration date: 8/15/2022  Treatment plan discussed with: patient        Subjective Evaluation    History of Present Illness  Mechanism of injury: Mayuri Magallon is a 58 y o  female presents today with worsening pain in her lower back with radiation to both gluteal areas and posterior thighs  It hurts to sit and she has to take ibuprofen for relief  There is no numbness  She saw the chiropractor and hgave her stretches to do with some relief  There is no loss of bowel or bladder function  There is no trouble walking  She doesn't feel weakness in her lower extremities  There is no radiation to the feet  There is tightness in the area when it flares up- it, and it is worse with bending over and prolonged sitting  It started with a lot of lifting and transfers of her  who passed 10 months ago  The patient denies any paresthesias  she needs to take advil or tylenol to sit longer  Pt notes that the steroid pack she was prescribed she hasn't taken  She has held off of yoga out of fear  Quality of life: good    Pain  Current pain ratin  At best pain ratin  At worst pain ratin  Location: gluteal, and post thigh  Quality: dull ache, discomfort, radiating and tight    Patient Goals  Patient goals for therapy: decreased pain, increased motion, independence with ADLs/IADLs, increased strength and return to sport/leisure activities          Objective     Static Posture   General Observations  Flexed and guarded  Shoulders  Rounded      Thoracic Spine  Hyperkyphosis  Lumbar Spine   Decreased lordosis  Active Range of Motion     Lumbar   Flexion:  WFL  Extension:  Restriction level: minimal    Joint Play     Hypomobile: L1, L2, L3, L4 and L5   Mechanical Assessment    Cervical      Thoracic      Lumbar    Sitting flexion: sustained positions  Pain location: peripheralized  Standing extension: sustained positions  Pain location: centralized    Strength/Myotome Testing     Left Hip   Planes of Motion   Extension: 3+    Isolated Muscles   Gluteus cindi: 4    Right Hip   Planes of Motion   Extension: 3+    Isolated Muscles   Gluteus maximums: 4-    Left Knee   Flexion: 3+    Right Knee   Flexion: 3+    Tests     Lumbar   Negative prone instability   Left   Negative passive SLR and slump test      Right   Negative slump test      Ambulation     Observational Gait   Decreased walking speed and stride length  Functional Assessment      Squat    Left valgus and right valgus       Posterior weight shift: moderate    Depth of femur height: above 90 degrees             Precautions: none      Manuals 6/20                                                                Neuro Re-Ed                                                                                                        Ther Ex             ppu x10            S/l bridge nv            Bridge band nv            S/l hip abd nv            Prone plank nv            Side plank nv                                      Ther Activity                                       Gait Training                                       Modalities             HEP DB

## 2022-06-29 ENCOUNTER — TELEPHONE (OUTPATIENT)
Dept: GASTROENTEROLOGY | Facility: CLINIC | Age: 62
End: 2022-06-29

## 2022-06-29 DIAGNOSIS — Z12.11 SCREENING FOR COLON CANCER: Primary | ICD-10-CM

## 2022-06-30 NOTE — TELEPHONE ENCOUNTER
Scheduled date of colonoscopy (as of today):7/26/22  Physician performing colonoscopy: Dr Hilda Morgan  Location of colonoscopy:[facility]  Bowel prep reviewed with patient: [place]  Instructions reviewed with patient by:Sparkle Cox CMA  Clearances: None

## 2022-07-11 ENCOUNTER — APPOINTMENT (OUTPATIENT)
Dept: PHYSICAL THERAPY | Facility: CLINIC | Age: 62
End: 2022-07-11
Payer: COMMERCIAL

## 2022-07-12 ENCOUNTER — OFFICE VISIT (OUTPATIENT)
Dept: PHYSICAL THERAPY | Facility: CLINIC | Age: 62
End: 2022-07-12
Payer: COMMERCIAL

## 2022-07-12 DIAGNOSIS — G57.03 BILATERAL PIRIFORMIS SYNDROME: Primary | ICD-10-CM

## 2022-07-12 DIAGNOSIS — M54.42 CHRONIC RIGHT-SIDED LOW BACK PAIN WITH BILATERAL SCIATICA: ICD-10-CM

## 2022-07-12 DIAGNOSIS — G89.29 CHRONIC RIGHT-SIDED LOW BACK PAIN WITH BILATERAL SCIATICA: ICD-10-CM

## 2022-07-12 DIAGNOSIS — M54.41 CHRONIC RIGHT-SIDED LOW BACK PAIN WITH BILATERAL SCIATICA: ICD-10-CM

## 2022-07-12 PROCEDURE — 97110 THERAPEUTIC EXERCISES: CPT | Performed by: PHYSICAL THERAPIST

## 2022-07-12 NOTE — PROGRESS NOTES
Daily Note     Today's date: 2022  Patient name: Jim Mao  : 1960  MRN: [id number]  Referring provider: Kathy Raza DO  Dx:   Encounter Diagnosis     ICD-10-CM    1  Bilateral piriformis syndrome  G57 03    2  Chronic right-sided low back pain with bilateral sciatica  M54 41     M54 42     G89 29                   Subjective: pt notes a large improvement in her symptoms only some occasional pain on the right gluteal and the right side of the l/s  And no tylenol in probably 2 weeks      Objective: See treatment diary below      Assessment: exercises advanced today to include core strength  Will progress to ham curl with the ball, as well as bird dips next visit      Plan: Continue per plan of care        Precautions: none      Manuals                                                                Neuro Re-Ed                                                                                                        Ther Ex             ppu x10 x10           S/l bridge nv nv           Bridge band nv nv           S/l hip abd nv            Prone plank nv 20''x3           Side plank nv 15''x3 ea           Dead bug  2x10           Bal bridge  2x10           Ball self massage fig 4  2'                                     Gait Training                                       Modalities             HEP DB

## 2022-07-22 ENCOUNTER — TELEPHONE (OUTPATIENT)
Dept: GASTROENTEROLOGY | Facility: AMBULATORY SURGERY CENTER | Age: 62
End: 2022-07-22

## 2022-07-25 NOTE — TELEPHONE ENCOUNTER
Confirmed arrival time and reviewed procedure instructions   Advised  must be a responsible adult over the age of 25 and may not use Illene Held

## 2022-07-26 ENCOUNTER — HOSPITAL ENCOUNTER (OUTPATIENT)
Dept: GASTROENTEROLOGY | Facility: AMBULATORY SURGERY CENTER | Age: 62
Discharge: HOME | End: 2022-07-26
Payer: COMMERCIAL

## 2022-07-26 ENCOUNTER — ANESTHESIA EVENT (OUTPATIENT)
Dept: GASTROENTEROLOGY | Facility: AMBULATORY SURGERY CENTER | Age: 62
End: 2022-07-26

## 2022-07-26 ENCOUNTER — ANESTHESIA (OUTPATIENT)
Dept: GASTROENTEROLOGY | Facility: AMBULATORY SURGERY CENTER | Age: 62
End: 2022-07-26

## 2022-07-26 VITALS
DIASTOLIC BLOOD PRESSURE: 68 MMHG | OXYGEN SATURATION: 99 % | SYSTOLIC BLOOD PRESSURE: 112 MMHG | WEIGHT: 135 LBS | HEART RATE: 53 BPM | TEMPERATURE: 97.3 F | RESPIRATION RATE: 20 BRPM | BODY MASS INDEX: 19.33 KG/M2 | HEIGHT: 70 IN

## 2022-07-26 DIAGNOSIS — Z12.11 COLON CANCER SCREENING: ICD-10-CM

## 2022-07-26 PROCEDURE — 45385 COLONOSCOPY W/LESION REMOVAL: CPT | Performed by: INTERNAL MEDICINE

## 2022-07-26 PROCEDURE — 45380 COLONOSCOPY AND BIOPSY: CPT | Performed by: INTERNAL MEDICINE

## 2022-07-26 RX ORDER — PROPOFOL 10 MG/ML
INJECTION, EMULSION INTRAVENOUS AS NEEDED
Status: DISCONTINUED | OUTPATIENT
Start: 2022-07-26 | End: 2022-07-26

## 2022-07-26 RX ORDER — SODIUM CHLORIDE, SODIUM LACTATE, POTASSIUM CHLORIDE, CALCIUM CHLORIDE 600; 310; 30; 20 MG/100ML; MG/100ML; MG/100ML; MG/100ML
INJECTION, SOLUTION INTRAVENOUS CONTINUOUS PRN
Status: DISCONTINUED | OUTPATIENT
Start: 2022-07-26 | End: 2022-07-26

## 2022-07-26 RX ORDER — SODIUM CHLORIDE, SODIUM LACTATE, POTASSIUM CHLORIDE, CALCIUM CHLORIDE 600; 310; 30; 20 MG/100ML; MG/100ML; MG/100ML; MG/100ML
50 INJECTION, SOLUTION INTRAVENOUS CONTINUOUS
Status: DISCONTINUED | OUTPATIENT
Start: 2022-07-26 | End: 2022-07-30 | Stop reason: HOSPADM

## 2022-07-26 RX ADMIN — PROPOFOL 50 MG: 10 INJECTION, EMULSION INTRAVENOUS at 08:17

## 2022-07-26 RX ADMIN — PROPOFOL 50 MG: 10 INJECTION, EMULSION INTRAVENOUS at 08:14

## 2022-07-26 RX ADMIN — PROPOFOL 50 MG: 10 INJECTION, EMULSION INTRAVENOUS at 08:06

## 2022-07-26 RX ADMIN — PROPOFOL 50 MG: 10 INJECTION, EMULSION INTRAVENOUS at 08:21

## 2022-07-26 RX ADMIN — SODIUM CHLORIDE, SODIUM LACTATE, POTASSIUM CHLORIDE, CALCIUM CHLORIDE: 600; 310; 30; 20 INJECTION, SOLUTION INTRAVENOUS at 08:00

## 2022-07-26 RX ADMIN — PROPOFOL 50 MG: 10 INJECTION, EMULSION INTRAVENOUS at 08:10

## 2022-07-26 RX ADMIN — SODIUM CHLORIDE, SODIUM LACTATE, POTASSIUM CHLORIDE, CALCIUM CHLORIDE 50 ML/HR: 600; 310; 30; 20 INJECTION, SOLUTION INTRAVENOUS at 07:54

## 2022-07-26 RX ADMIN — PROPOFOL 100 MG: 10 INJECTION, EMULSION INTRAVENOUS at 08:03

## 2022-07-26 NOTE — ANESTHESIA POSTPROCEDURE EVALUATION
Post-Op Assessment Note    CV Status:  Stable  Pain Score: 0    Pain management: adequate     Mental Status:  Arousable and sleepy   Hydration Status:  Stable   PONV Controlled:  Controlled   Airway Patency:  Patent      Post Op Vitals Reviewed: Yes      Staff: CRNA         No complications documented      BP   108/63   Temp 97 6   Pulse 56   Resp 14   SpO2 99

## 2022-07-26 NOTE — H&P
History and Physical - [facility]  Lam Tran 58 y o  female MRN: [id number]    HPI: Lam Tran is a 58y o  year old female who presents for colon cancer screening    REVIEW OF SYSTEMS: Per the HPI, and otherwise unremarkable  Historical Information   Past Medical History:   Diagnosis Date    Asymptomatic bacteriuria     Last Assessed: 2013     Contusion of left chest wall     Last Assessed: 10/20/2015     Elevated ALT measurement     Last Assessed: 2013    GERD (gastroesophageal reflux disease)     Irritable bowel syndrome     Right lateral epicondylitis     Last Assessed: 2013      Past Surgical History:   Procedure Laterality Date    BREAST BIOPSY Left     EXCISIONAL BIOPSY BENIGN    COLONOSCOPY  2012    normal mucosa throughout the whole colon, grade 2 internal hemorrhoids, 10 year recall 2022     NO PAST SURGERIES       Social History   Social History     Substance and Sexual Activity   Alcohol Use Yes    Alcohol/week: 7 0 standard drinks    Types: 7 Glasses of wine per week    Comment: social, Occasional Alcohol Use       Social History     Substance and Sexual Activity   Drug Use No     Social History     Tobacco Use   Smoking Status Former Smoker    Packs/day: 1 00    Years: 5 00    Pack years: 5 00    Types: Cigarettes    Start date: 1978   [place] Quit date: 1983    Years since quittin 5   Smokeless Tobacco Never Used   Tobacco Comment    Per [employer]: Former Smoker - remote history         Family History   Problem Relation Age of Onset    Fibromyalgia Mother     Leukemia Mother     Arthritis Mother     Cervical cancer Maternal Grandmother     Cancer Maternal Grandmother     Colon cancer Paternal Grandmother     Cancer Paternal Grandmother     Breast cancer Cousin 36    Diabetes Father     Breast cancer Cousin 39       Meds/Allergies       Current Outpatient Medications:     Bacillus Coagulans-Inulin (Probiotic) 1-250 BILLION-MG CAPS    Cholecalciferol (Vitamin D3) 50 MCG (2000 UT) CHEW    Cyanocobalamin (B-12) 1000 MCG CAPS    L-Tryptophan 500 MG CAPS    Lysine 1000 MG TABS    Multiple Vitamins-Minerals (PRESERVISION AREDS PO)    NON FORMULARY    TURMERIC PO    methylPREDNISolone 4 MG tablet therapy pack    polyethylene glycol (GOLYTELY) 4000 mL solution    Current Facility-Administered Medications:     lactated ringers infusion, 50 mL/hr, Intravenous, Continuous, 50 mL/hr at 07/26/22 0754    Allergies   Allergen Reactions    Naproxen      [facility] - [id number]: The patient felt disoriented    Sulfa Antibiotics Rash       Objective     /72   Pulse 71   Temp (!) 97 3 °F (36 3 °C) (Temporal)   Resp 19   Ht 5' 10" (1 778 m)   Wt 61 2 kg (135 lb)   LMP  (LMP Unknown)   SpO2 99%   BMI 19 37 kg/m²     PHYSICAL EXAM    Gen: NAD AAOx3  Head: Normocephalic, Atraumatic  CV: S1S2 RRR no m/r/g  CHEST: Clear b/l no c/r/w  ABD: soft, +BS NT/ND  EXT: no edema    ASSESSMENT/PLAN:  This is a 58y o  year old female here for colonoscopy, and she is stable and optimized for her procedure

## 2022-07-26 NOTE — ANESTHESIA PREPROCEDURE EVALUATION
Procedure:  COLONOSCOPY    Relevant Problems   CARDIO   (+) Hyperlipidemia      GI/HEPATIC   (+) GERD (gastroesophageal reflux disease)        Physical Exam    Airway    Mallampati score: II  TM Distance: >3 FB  Neck ROM: full     Dental   No notable dental hx     Cardiovascular  Cardiovascular exam normal    Pulmonary  Pulmonary exam normal     Other Findings        Anesthesia Plan  ASA Score- 2     Anesthesia Type- IV sedation with anesthesia with ASA Monitors  Additional Monitors:   Airway Plan:           Plan Factors-    Chart reviewed  Patient summary reviewed  Patient is not a current smoker  Induction- intravenous  Postoperative Plan-     Informed Consent- Anesthetic plan and risks discussed with patient  I personally reviewed this patient with the CRNA  Discussed and agreed on the Anesthesia Plan with the CRNA  Freya Bradshaw

## 2022-11-04 ENCOUNTER — HOSPITAL ENCOUNTER (OUTPATIENT)
Dept: ULTRASOUND IMAGING | Facility: CLINIC | Age: 62
Discharge: HOME | End: 2022-11-04

## 2022-11-04 ENCOUNTER — HOSPITAL ENCOUNTER (OUTPATIENT)
Dept: MAMMOGRAPHY | Facility: CLINIC | Age: 62
Discharge: HOME | End: 2022-11-04

## 2022-11-04 VITALS — WEIGHT: 135 LBS | BODY MASS INDEX: 19.33 KG/M2 | HEIGHT: 70 IN

## 2022-11-04 DIAGNOSIS — Z12.31 ENCOUNTER FOR SCREENING MAMMOGRAM FOR BREAST CANCER: ICD-10-CM

## 2022-11-04 DIAGNOSIS — Z12.39 ENCOUNTER FOR BREAST CANCER SCREENING OTHER THAN MAMMOGRAM: ICD-10-CM

## 2022-11-07 ENCOUNTER — ANNUAL EXAM (OUTPATIENT)
Dept: GYNECOLOGY | Facility: CLINIC | Age: 62
End: 2022-11-07

## 2022-11-07 VITALS
SYSTOLIC BLOOD PRESSURE: 110 MMHG | HEIGHT: 69 IN | WEIGHT: 135 LBS | DIASTOLIC BLOOD PRESSURE: 60 MMHG | BODY MASS INDEX: 19.99 KG/M2

## 2022-11-07 DIAGNOSIS — Z12.4 CERVICAL CANCER SCREENING: Primary | ICD-10-CM

## 2022-11-07 DIAGNOSIS — R92.2 DENSE BREAST TISSUE ON MAMMOGRAM: ICD-10-CM

## 2022-11-07 DIAGNOSIS — Z01.419 ENCOUNTER FOR ANNUAL ROUTINE GYNECOLOGICAL EXAMINATION: ICD-10-CM

## 2022-11-07 DIAGNOSIS — Z11.51 SCREENING FOR HPV (HUMAN PAPILLOMAVIRUS): ICD-10-CM

## 2022-11-07 DIAGNOSIS — Z12.31 ENCOUNTER FOR SCREENING MAMMOGRAM FOR BREAST CANCER: ICD-10-CM

## 2022-11-07 NOTE — PROGRESS NOTES
Assessment/Plan:    Pap and HPV done today    mammogram reviewed with her including breast density  She would like to continue having mammogram and ABUS  These were both ordered for next year  Discussed self breast exams    colon cancer screening - colonoscopy done in July 2022, recall in 5 years    discussed preventive care, regular exercise and a healthy diet    Vaginal atrophy-this is asymptomatic  If she has any concerns, she will call      No problem-specific Assessment & Plan notes found for this encounter  Diagnoses and all orders for this visit:    Cervical cancer screening  -     IGP, Aptima HPV, Rfx 16/18,45    Encounter for annual routine gynecological examination    Encounter for screening mammogram for breast cancer  -     Mammo screening bilateral w 3d & cad; Future    Dense breast tissue on mammogram  -     US breast screening bilateral complete (ABUS); Future    Screening for HPV (human papillomavirus)  -     IGP, Aptima HPV, Rfx 16/18,45          Subjective:      Patient ID: Prosper Ricketts is a 58 y o  female  Pt here for yearly  She has no gyn complaints  She is not sexually active, shew was  last year  Normal pap, negative HPV in 2021, s/p TAMERA and ASCUS in 2019, emb and emc were normal  She had a mammogram and ABUS last week, they are pending      The following portions of the patient's history were reviewed and updated as appropriate: allergies, current medications, past family history, past medical history, past social history, past surgical history and problem list     Review of Systems   Constitutional: Negative  Gastrointestinal: Negative  Genitourinary: Negative  Objective:      LMP  (LMP Unknown)          Physical Exam  Vitals reviewed  Constitutional:       Appearance: She is well-developed  Neck:      Thyroid: No thyromegaly  Trachea: No tracheal deviation  Cardiovascular:      Rate and Rhythm: Normal rate and regular rhythm  Pulmonary:      Effort: Pulmonary effort is normal       Breath sounds: Normal breath sounds  Chest:   Breasts: Breasts are symmetrical       Right: No inverted nipple, mass, nipple discharge, skin change or tenderness  Left: No inverted nipple, mass, nipple discharge, skin change or tenderness  Abdominal:      General: There is no distension  Palpations: Abdomen is soft  There is no mass  Tenderness: There is no abdominal tenderness  Genitourinary:     Labia:         Right: No rash, tenderness, lesion or injury  Left: No rash, tenderness, lesion or injury  Vagina: Normal       Cervix: No cervical motion tenderness, discharge or friability  Adnexa:         Right: No mass, tenderness or fullness  Left: No mass, tenderness or fullness          Rectum: Normal       Comments: Very atrophic

## 2022-11-12 LAB
CYTOLOGIST CVX/VAG CYTO: NORMAL
DX ICD CODE: NORMAL
HPV GENOTYPE REFLEX: NORMAL
HPV I/H RISK 4 DNA CVX QL PROBE+SIG AMP: NEGATIVE
OTHER STN SPEC: NORMAL
PATH REPORT.FINAL DX SPEC: NORMAL
SL AMB NOTE:: NORMAL
SL AMB SPECIMEN ADEQUACY: NORMAL
SL AMB TEST METHODOLOGY: NORMAL

## 2022-12-14 NOTE — TELEPHONE ENCOUNTER
I believe we have it in stock  As long as she is aware about the potential cost I have no problem with this
Patient would like to know if we could give her the Shingles vaccine in office if not could an order be created for her to go to the pharmacy  She has checked with her insurance and it will be covered 
Please contact patient and you can offer her an appt if she is okay with potential cost 
How Severe Is This Condition?: moderate

## 2022-12-20 ENCOUNTER — TELEPHONE (OUTPATIENT)
Dept: GASTROENTEROLOGY | Facility: CLINIC | Age: 62
End: 2022-12-20

## 2022-12-20 NOTE — TELEPHONE ENCOUNTER
Patients GI provider:  Dr Saundra Sheth    Number to return call: [phone])    Reason for call: Pt calling for an appt after 12/29 for constipation and pain on side because she is currently on vacation  Please call with an appointment, nothing until the end of March  Thank you!     Scheduled procedure/appointment date if applicable: Apt/procedure

## 2022-12-30 ENCOUNTER — OFFICE VISIT (OUTPATIENT)
Dept: GASTROENTEROLOGY | Facility: CLINIC | Age: 62
End: 2022-12-30

## 2022-12-30 VITALS
DIASTOLIC BLOOD PRESSURE: 68 MMHG | HEIGHT: 69 IN | WEIGHT: 138 LBS | BODY MASS INDEX: 20.44 KG/M2 | SYSTOLIC BLOOD PRESSURE: 116 MMHG

## 2022-12-30 DIAGNOSIS — K59.00 CONSTIPATION, UNSPECIFIED CONSTIPATION TYPE: ICD-10-CM

## 2022-12-30 DIAGNOSIS — R19.4 CHANGE IN BOWEL HABITS: Primary | ICD-10-CM

## 2022-12-30 NOTE — LETTER
December 30, 2022     DO Rocco  [address] [place] [address] [address] [facility]    Patient: Rand George   YOB: 1960   Date of Visit: 12/30/2022       Dear Dr Chelle Weber: Thank you for referring Satinder Valera to me for evaluation  Below are my notes for this consultation  If you have questions, please do not hesitate to call me  I look forward to following your patient along with you  Sincerely,        ROGER Moran        CC: No Recipients  ROGER Moran  12/30/2022  4:10 PM  Sign when Signing Visit  [facility] - Outpatient Follow-up Note  Rand George 58 y o  female MRN: [id number]  Encounter: [id number]    ASSESSMENT AND PLAN:      1  Change in bowel habits  2  Constipation, unspecified constipation type  3  Right lower quadrant abdominal pain  5-month history of change in bowel habits after undergoing colonoscopy in July 2022  Reports decreased caliber of stool, soft bowel movement every 2 to 3 days  Associated feeling of incomplete evacuation  Also experiencing almost daily right lower quadrant abdominal pain  Colonoscopy 7/26/2022-2 adenomatous polyps excised  No alarm symptoms-denies recent weight loss, fevers or chills  No recent change in diet, no new medications  -Recommend CT scan of the abdomen to rule out intra-abdominal/pelvic mass, obstruction  -If CT scan of the abdomen negative, consider stool cultures to evaluate for Giardia, or other infectious etiology    4  History of colon polyps  Colonoscopy July 2022 revealed 2 adenomatous polyps    Recall in 5 years/2027      Followup Appointment: 3 months with MD  ______________________________________________________________________    Chief Complaint   Patient presents with   • Constipation   • Skinny hard bm's since July      Is vegan, drinks a lot of water, eats a lot of fiber, exercises regularly, pain right lower quadrant (dull ache-2 on pain scale) HPI: Patient presents with change in bowel habits since July 2022  She underwent colonoscopy 7/26/2022-2 adenomatous colon polyps were excised  Her normal bowel pattern was formed bowel movement daily every morning  Patient reports that since undergoing colonoscopy, she has been experiencing constipation with small soft bowel movement every 2 to 3 days  Reports change in caliber with stringy narrow bowel movements  No formed bowel movement x5 months, feeling of incomplete evacuation   Denies hematochezia or melena  Denies mucousy, fatty or greasy bowel movements     Rare bright red blood with wiping after bowel movement  She denies any rectal pain but is experiencing almost daily right lower quadrant pain  Described dull ache at right lower quadrant, no improvement after bowel movement, slight increase after eating  Denies recent weight loss  Appetite has been good, no nausea or vomiting      She does have well water at her home and also camps frequently  She denies recent weight loss, no fever or chills  She is vegan and reports eating high-fiber diet      Historical Information   Past Medical History:   Diagnosis Date   • Asymptomatic bacteriuria     Last Assessed: 4/8/2013    • Contusion of left chest wall     Last Assessed: 10/20/2015    • Elevated ALT measurement     Last Assessed: 4/8/2013   • GERD (gastroesophageal reflux disease)    • Irritable bowel syndrome    • Right lateral epicondylitis     Last Assessed: 11/8/2013      Past Surgical History:   Procedure Laterality Date   • BREAST BIOPSY Left 1994    EXCISIONAL BIOPSY BENIGN   • COLONOSCOPY  04/03/2012    normal mucosa throughout the whole colon, grade 2 internal hemorrhoids, 10 year recall April 2022    • NO PAST SURGERIES       Social History     Substance and Sexual Activity   Alcohol Use Yes   • Alcohol/week: 7 0 standard drinks   • Types: 7 Glasses of wine per week    Comment: social, Occasional Alcohol Use       Social History Substance and Sexual Activity   Drug Use No     Social History     Tobacco Use   Smoking Status Former   • Packs/day: 1 00   • Years: 5 00   • Pack years: 5 00   • Types: Cigarettes   • Start date: 1978   • Quit date: 1983   • Years since quittin 0   Smokeless Tobacco Never   Tobacco Comments    Per [employer]: Former Smoker - remote history  Family History   Problem Relation Age of Onset   • Fibromyalgia Mother    • Leukemia Mother    • Arthritis Mother    • Diabetes Father    • Cervical cancer Maternal Grandmother    • Cancer Maternal Grandmother    • Colon cancer Paternal Grandmother    • Cancer Paternal Grandmother    • Kidney disease Paternal Aunt         Polycystic disease throughout [facility]   • Breast cancer Cousin 36   • Breast cancer Cousin 39   • Colon polyps Neg Hx          Current Outpatient Medications:   •  Bacillus Coagulans-Inulin (Probiotic) 1-250 BILLION-MG CAPS  •  Cholecalciferol (Vitamin D3) 48 MCG ( UT) CHEW  •  Cyanocobalamin (B-12) 1000 MCG CAPS  •  L-Tryptophan 500 MG CAPS  •  Lysine 1000 MG TABS  •  Multiple Vitamins-Minerals (PRESERVISION AREDS PO)  •  NON FORMULARY  •  TURMERIC PO  Allergies   Allergen Reactions   • Naproxen      Annotation - [id number]: The patient felt disoriented   • Sulfa Antibiotics Rash     Reviewed medications and allergies and updated as indicated    PHYSICAL EXAM:    Blood pressure 116/68, height 5' 8 5" (1 74 m), weight 62 6 kg (138 lb), not currently breastfeeding  Body mass index is 20 68 kg/m²  General Appearance: NAD, cooperative, alert  Eyes: Anicteric  ENT:  Normocephalic, atraumatic, normal mucosa  Neck:  Supple, symmetrical, trachea midline  Resp:  Clear to auscultation bilaterally; no rales, rhonchi or wheezing; respirations unlabored   CV:  S1 S2, Regular rate and rhythm; no murmur, rub, or gallop    GI:  Soft, non-tender, non-distended; normal bowel sounds; no masses, no organomegaly   Rectal: Deferred  Musculoskeletal: No cyanosis, clubbing or edema  Normal ROM    Skin:  No jaundice, rashes, or lesions   Psych: Normal affect, good eye contact  Neuro: No gross deficits, AAOx3    Lab Results:   Lab Results   Component Value Date    WBC 5 6 12/10/2021    HGB 14 1 12/10/2021    HCT 42 4 12/10/2021    MCV 98 (H) 12/10/2021     12/10/2021     Lab Results   Component Value Date     07/17/2014    K 5 2 04/22/2022     (H) 04/22/2022    CO2 24 04/22/2022    ANIONGAP 1 (L) 07/17/2014    BUN 16 04/22/2022    CREATININE 0 66 04/22/2022    GLUCOSE 94 07/17/2014    GLUF 85 04/13/2017    CALCIUM 10 3 01/30/2020    AST 26 04/22/2022    ALT 18 04/22/2022    ALKPHOS 82 01/30/2020    PROT 7 6 07/17/2014    BILITOT 0 17 (L) 07/17/2014    EGFR 100 04/22/2022     Lab Results   Component Value Date    FERRITIN 89 09/25/2018

## 2022-12-30 NOTE — PROGRESS NOTES
[facility] - Outpatient Follow-up Note  Fracisco Cannon 58 y o  female MRN: [id number]  Encounter: [id number]    ASSESSMENT AND PLAN:      1  Change in bowel habits  2  Constipation, unspecified constipation type  3  Right lower quadrant abdominal pain  5-month history of change in bowel habits after undergoing colonoscopy in July 2022  Reports decreased caliber of stool, soft bowel movement every 2 to 3 days  Associated feeling of incomplete evacuation  Also experiencing almost daily right lower quadrant abdominal pain  Colonoscopy 7/26/2022-2 adenomatous polyps excised  No alarm symptoms-denies recent weight loss, fevers or chills  No recent change in diet, no new medications  -Recommend CT scan of the abdomen to rule out intra-abdominal/pelvic mass, obstruction  -If CT scan of the abdomen negative, consider stool cultures to evaluate for Giardia, or other infectious etiology    4  History of colon polyps  Colonoscopy July 2022 revealed 2 adenomatous polyps  Recall in 5 years/2027      Followup Appointment: 3 months with MD  ______________________________________________________________________    Chief Complaint   Patient presents with   • Constipation   • Skinny hard bm's since July      Is vegan, drinks a lot of water, eats a lot of fiber, exercises regularly, pain right lower quadrant (dull ache-2 on pain scale)     HPI: Patient presents with change in bowel habits since July 2022  She underwent colonoscopy 7/26/2022-2 adenomatous colon polyps were excised  Her normal bowel pattern was formed bowel movement daily every morning  Patient reports that since undergoing colonoscopy, she has been experiencing constipation with small soft bowel movement every 2 to 3 days  Reports change in caliber with stringy narrow bowel movements  No formed bowel movement x5 months, feeling of incomplete evacuation   Denies hematochezia or melena    Denies mucousy, fatty or greasy bowel movements     Rare bright red blood with wiping after bowel movement  She denies any rectal pain but is experiencing almost daily right lower quadrant pain  Described dull ache at right lower quadrant, no improvement after bowel movement, slight increase after eating  Denies recent weight loss  Appetite has been good, no nausea or vomiting  She does have well water at her home and also camps frequently  She denies recent weight loss, no fever or chills  She is vegan and reports eating high-fiber diet      Historical Information   Past Medical History:   Diagnosis Date   • Asymptomatic bacteriuria     Last Assessed: 2013    • Contusion of left chest wall     Last Assessed: 10/20/2015    • Elevated ALT measurement     Last Assessed: 2013   • GERD (gastroesophageal reflux disease)    • Irritable bowel syndrome    • Right lateral epicondylitis     Last Assessed: 2013      Past Surgical History:   Procedure Laterality Date   • BREAST BIOPSY Left     EXCISIONAL BIOPSY BENIGN   • COLONOSCOPY  2012    normal mucosa throughout the whole colon, grade 2 internal hemorrhoids, 10 year recall 2022    • NO PAST SURGERIES       Social History     Substance and Sexual Activity   Alcohol Use Yes   • Alcohol/week: 7 0 standard drinks   • Types: 7 Glasses of wine per week    Comment: social, Occasional Alcohol Use       Social History     Substance and Sexual Activity   Drug Use No     Social History     Tobacco Use   Smoking Status Former   • Packs/day: 1 00   • Years: 5 00   • Pack years: 5 00   • Types: Cigarettes   • Start date: 1978   • Quit date: 1983   • Years since quittin 0   Smokeless Tobacco Never   Tobacco Comments    Per Allscripts: Former Smoker - remote history         Family History   Problem Relation Age of Onset   • Fibromyalgia Mother    • Leukemia Mother    • Arthritis Mother    • Diabetes Father    • Cervical cancer Maternal Grandmother    • Cancer Maternal Grandmother    • Colon cancer Paternal Grandmother    • Cancer Paternal Grandmother    • Kidney disease Paternal Aunt         Polycystic disease throughout famiky   • Breast cancer Cousin 36   • Breast cancer Cousin 39   • Colon polyps Neg Hx          Current Outpatient Medications:   •  Bacillus Coagulans-Inulin (Probiotic) 1-250 BILLION-MG CAPS  •  Cholecalciferol (Vitamin D3) 50 MCG (2000 UT) CHEW  •  Cyanocobalamin (B-12) 1000 MCG CAPS  •  L-Tryptophan 500 MG CAPS  •  Lysine 1000 MG TABS  •  Multiple Vitamins-Minerals (PRESERVISION AREDS PO)  •  NON FORMULARY  •  TURMERIC PO  Allergies   Allergen Reactions   • Naproxen      Annotation - [id number]: The patient felt disoriented   • Sulfa Antibiotics Rash     Reviewed medications and allergies and updated as indicated    PHYSICAL EXAM:    Blood pressure 116/68, height 5' 8 5" (1 74 m), weight 62 6 kg (138 lb), not currently breastfeeding  Body mass index is 20 68 kg/m²  General Appearance: NAD, cooperative, alert  Eyes: Anicteric  ENT:  Normocephalic, atraumatic, normal mucosa  Neck:  Supple, symmetrical, trachea midline  Resp:  Clear to auscultation bilaterally; no rales, rhonchi or wheezing; respirations unlabored   CV:  S1 S2, Regular rate and rhythm; no murmur, rub, or gallop  GI:  Soft, non-tender, non-distended; normal bowel sounds; no masses, no organomegaly   Rectal: Deferred  Musculoskeletal: No cyanosis, clubbing or edema  Normal ROM    Skin:  No jaundice, rashes, or lesions   Psych: Normal affect, good eye contact  Neuro: No gross deficits, AAOx3    Lab Results:   Lab Results   Component Value Date    WBC 5 6 12/10/2021    HGB 14 1 12/10/2021    HCT 42 4 12/10/2021    MCV 98 (H) 12/10/2021     12/10/2021     Lab Results   Component Value Date     07/17/2014    K 5 2 04/22/2022     (H) 04/22/2022    CO2 24 04/22/2022    ANIONGAP 1 (L) 07/17/2014    BUN 16 04/22/2022    CREATININE 0 66 04/22/2022    GLUCOSE 94 07/17/2014    GLUF 85 04/13/2017    CALCIUM 10 3 01/30/2020    AST 26 04/22/2022    ALT 18 04/22/2022    ALKPHOS 82 01/30/2020    PROT 7 6 07/17/2014    BILITOT 0 17 (L) 07/17/2014    EGFR 100 04/22/2022     Lab Results   Component Value Date    FERRITIN 89 09/25/2018

## 2023-01-04 ENCOUNTER — TELEPHONE (OUTPATIENT)
Dept: ADMINISTRATIVE | Facility: HOSPITAL | Age: 63
End: 2023-01-04

## 2023-01-04 ENCOUNTER — TELEPHONE (OUTPATIENT)
Dept: GASTROENTEROLOGY | Facility: CLINIC | Age: 63
End: 2023-01-04

## 2023-01-04 NOTE — TELEPHONE ENCOUNTER
I initiated PA through [facility]  I was able to obtain approval, but [facility] was not a facility option ONLY [facility]'s  I left a  for patient informing her  I suggested she call her insurance as I am not able to change performing facility within [facility]

## 2023-01-04 NOTE — TELEPHONE ENCOUNTER
Patients GI provider:  Pam Brice    Number to return call: [phone]    Reason for call: Pt calling requesting per certification number for her CT of abdomen and pelvis with contrast     Scheduled procedure/appointment date if applicable: N/A    Email was sent to [email or web address]

## 2023-01-05 NOTE — TELEPHONE ENCOUNTER
Patient is capitated to [facility] either [address] or [facility], any other [address] location is considered out of network  [facility] is also out of Network  I called Central Scheduling  [phone] spoke with Macy Dumont, She is scheduled for Monday 1/23/23 at 1:30 pm  [address] [place] [address] I called [facility] to update servicing provider location  I spoke with Patrice intake representative at [place] [address]

## 2023-01-18 LAB — HEMOCCULT STL QL IA: NEGATIVE

## 2023-01-23 ENCOUNTER — HOSPITAL ENCOUNTER (OUTPATIENT)
Dept: CT IMAGING | Facility: HOSPITAL | Age: 63
Discharge: HOME | End: 2023-01-23

## 2023-01-23 DIAGNOSIS — K59.00 CONSTIPATION, UNSPECIFIED CONSTIPATION TYPE: ICD-10-CM

## 2023-01-23 DIAGNOSIS — R19.4 CHANGE IN BOWEL HABITS: ICD-10-CM

## 2023-01-23 RX ADMIN — IOHEXOL 100 ML: 350 INJECTION, SOLUTION INTRAVENOUS at 13:38

## 2023-01-30 ENCOUNTER — TELEPHONE (OUTPATIENT)
Dept: GASTROENTEROLOGY | Facility: CLINIC | Age: 63
End: 2023-01-30

## 2023-01-30 NOTE — TELEPHONE ENCOUNTER
Spoke to patient and reviewed CT scan report which revealed constipation  Patient does report that she is moving bowels daily, feels that she is evacuating more effectively    Recommend she begin regimen of MiraLAX 1 capful daily and follow-up in the office in 2 to 3 months

## 2023-04-14 PROBLEM — B00.89 HERPES DERMATITIS: Status: ACTIVE | Noted: 2023-04-14

## 2023-04-24 ENCOUNTER — OFFICE VISIT (OUTPATIENT)
Dept: FAMILY MEDICINE CLINIC | Facility: CLINIC | Age: 63
End: 2023-04-24

## 2023-04-24 VITALS
OXYGEN SATURATION: 97 % | DIASTOLIC BLOOD PRESSURE: 70 MMHG | HEIGHT: 68 IN | RESPIRATION RATE: 17 BRPM | TEMPERATURE: 97.8 F | BODY MASS INDEX: 21.04 KG/M2 | HEART RATE: 68 BPM | WEIGHT: 138.8 LBS | SYSTOLIC BLOOD PRESSURE: 106 MMHG

## 2023-04-24 DIAGNOSIS — Z00.00 ANNUAL PHYSICAL EXAM: Primary | ICD-10-CM

## 2023-04-24 DIAGNOSIS — Z13.0 SCREENING FOR ENDOCRINE, METABOLIC AND IMMUNITY DISORDER: ICD-10-CM

## 2023-04-24 DIAGNOSIS — Z13.228 SCREENING FOR ENDOCRINE, METABOLIC AND IMMUNITY DISORDER: ICD-10-CM

## 2023-04-24 DIAGNOSIS — Z78.9 VEGAN DIET: ICD-10-CM

## 2023-04-24 DIAGNOSIS — Z13.1 SCREENING FOR DIABETES MELLITUS: ICD-10-CM

## 2023-04-24 DIAGNOSIS — Z13.6 SCREENING FOR CARDIOVASCULAR CONDITION: ICD-10-CM

## 2023-04-24 DIAGNOSIS — Z13.29 SCREENING FOR ENDOCRINE, METABOLIC AND IMMUNITY DISORDER: ICD-10-CM

## 2023-04-24 DIAGNOSIS — E78.2 MIXED HYPERLIPIDEMIA: ICD-10-CM

## 2023-04-24 NOTE — PROGRESS NOTES
[facility] PRACT[facility]    NAME: Yovana Akbar  AGE: 58 y o  SEX: female  : 1960     DATE: 2023     Assessment and Plan:     Problem List Items Addressed This Visit        Other    Hyperlipidemia    Relevant Orders    CBC and differential    Comprehensive metabolic panel    LDL cholesterol, direct    Lipid panel    TSH, 3rd generation with Free T4 reflex    UA (URINE) with reflex to Scope    Vitamin D 25 hydroxy    Vitamin B12    Ferritin    Vegan diet    Relevant Orders    Vitamin D 25 hydroxy    Vitamin B12    Ferritin   Other Visit Diagnoses     Annual physical exam    -  Primary    Relevant Orders    CBC and differential    Comprehensive metabolic panel    LDL cholesterol, direct    Lipid panel    TSH, 3rd generation with Free T4 reflex    UA (URINE) with reflex to Scope    Vitamin D 25 hydroxy    Vitamin B12    Ferritin    Screening for endocrine, metabolic and immunity disorder        Relevant Orders    CBC and differential    Comprehensive metabolic panel    LDL cholesterol, direct    Lipid panel    TSH, 3rd generation with Free T4 reflex    UA (URINE) with reflex to Scope    Vitamin D 25 hydroxy    Vitamin B12    Ferritin    Screening for cardiovascular condition        Relevant Orders    CBC and differential    Comprehensive metabolic panel    LDL cholesterol, direct    Lipid panel    TSH, 3rd generation with Free T4 reflex    UA (URINE) with reflex to Scope    Screening for diabetes mellitus        Relevant Orders    CBC and differential    Comprehensive metabolic panel    LDL cholesterol, direct    Lipid panel    TSH, 3rd generation with Free T4 reflex    UA (URINE) with reflex to Scope      The patient is doing very well  She is continuing with her healthy diet and exercise  She will go for the up-to-date lab work as ordered and we will follow-up with the results    We will see her back in the office as scheduled  Immunizations and preventive care screenings were discussed with patient today  Appropriate education was printed on patient's after visit summary  Counseling:  Dental Health: discussed importance of regular tooth brushing, flossing, and dental visits  Injury prevention: discussed safety/seat belts, safety helmets, smoke detectors, carbon dioxide detectors, and smoking near bedding or upholstery  Exercise: the importance of regular exercise/physical activity was discussed  Recommend exercise 3-5 times per week for at least 30 minutes  Depression Screening and Follow-up Plan: Patient was screened for depression during today's encounter  They screened negative with a PHQ-2 score of 0  Return in about 1 year (around 4/24/2024) for Annual physical      Chief Complaint:     Chief Complaint   Patient presents with   • Annual Exam     Complete Physical      History of Present Illness:     Adult Annual Physical   Patient here for a comprehensive physical exam  The patient reports no problems  Aliyah Pdailla is a 58 y o  female who presents today for a complete physical  she   has been feeling well and has no complaints today  The patient denies any chest pain, shortness of breath, or palpitations  There is no edema  There are no headaches or visual changes  There is no lightheadedness, dizziness, or fainting spells  There are no GI symptoms  The patient goes for dental exams every 6 months and sees her eye doctor  The patient is watching her diet and follows a regular exercise program    She had a colonoscopy 1/14/2023 and she needs a repeat in 5 years  She sees her Gyn regularly  She saw the dermatologist this morning  Diet and Physical Activity  Diet/Nutrition: well balanced diet, limited junk food, low fat diet, low carb diet and consuming 3-5 servings of fruits/vegetables daily  Exercise: moderate cardiovascular exercise        Depression Screening  PHQ-2/9 Depression Screening    Little interest or pleasure in doing things: 0 - not at all  Feeling down, depressed, or hopeless: 0 - not at all  PHQ-2 Score: 0  PHQ-2 Interpretation: Negative depression screen       General Health  Sleep: sleeps well  Hearing: decreased - bilateral   There is some decreased hearing  Vision: no vision problems, goes for regular eye exams, most recent eye exam <1 year ago and wears contacts  Dental: regular dental visits and brushes teeth twice daily  /GYN Health  Patient is: postmenopausal  She sees the OB/GYN regularly  Review of Systems:     Review of Systems   Constitutional: Negative  HENT: Negative  Eyes: Negative  Respiratory: Negative  Cardiovascular: Negative  Gastrointestinal: Negative  Endocrine: Negative  Genitourinary: Negative  Musculoskeletal: Negative  Skin: Negative  Allergic/Immunologic: Negative  Neurological: Negative  Hematological: Negative  Psychiatric/Behavioral: Negative  Past Medical History:     Past Medical History:   Diagnosis Date   • Asymptomatic bacteriuria     Last Assessed: 4/8/2013    • Contusion of left chest wall     Last Assessed: 10/20/2015    • Elevated ALT measurement     Last Assessed: 4/8/2013   • GERD (gastroesophageal reflux disease)    • Irritable bowel syndrome    • Right lateral epicondylitis     Last Assessed: 11/8/2013       Past Surgical History:     Past Surgical History:   Procedure Laterality Date   • BREAST BIOPSY Left 1994    EXCISIONAL BIOPSY BENIGN   • COLONOSCOPY  04/03/2012    normal mucosa throughout the whole colon, grade 2 internal hemorrhoids, 10 year recall April 2022    • NO PAST SURGERIES        Social History:     Social History     Socioeconomic History   • Marital status:       Spouse name: None   • Number of children: None   • Years of education: None   • Highest education level: None   Occupational History   • None   Tobacco Use   • Smoking status: Former Packs/day: 1 00     Years: 5 00     Pack years: 5 00     Types: Cigarettes     Start date: 1978     Quit date: 1983     Years since quittin 3   • Smokeless tobacco: Never   • Tobacco comments:     Per [employer]: Former Smoker - remote history  Vaping Use   • Vaping Use: Never used   Substance and Sexual Activity   • Alcohol use:  Yes     Alcohol/week: 7 0 standard drinks     Types: 7 Glasses of wine per week     Comment: social, Occasional Alcohol Use     • Drug use: No   • Sexual activity: Not Currently     Partners: Male     Birth control/protection: None   Other Topics Concern   • None   Social History Narrative    Consumes a vegan diet         Retired     Social Determinants of Health     Financial Resource Strain: Not on file   Food Insecurity: Not on file   Transportation Needs: Not on file   Physical Activity: Sufficiently Active   • Days of Exercise per Week: 7 days   • Minutes of Exercise per Session: 30 min   Stress: No Stress Concern Present   • Feeling of Stress : Not at all   Social Connections: Not on file   Intimate Partner Violence: Not At Risk   • Fear of Current or Ex-Partner: No   • Emotionally Abused: No   • Physically Abused: No   • Sexually Abused: No   Housing Stability: Not on file      Family History:     Family History   Problem Relation Age of Onset   • Fibromyalgia Mother    • Leukemia Mother    • Arthritis Mother    • Diabetes Father    • Cervical cancer Maternal Grandmother    • Cancer Maternal Grandmother    • Colon cancer Paternal Grandmother    • Cancer Paternal Grandmother    • Kidney disease Paternal Aunt         Polycystic disease throughout    • Breast cancer Cousin 36   • Breast cancer Cousin 45   • Colon polyps Neg Hx       Current Medications:     Current Outpatient Medications   Medication Sig Dispense Refill   • Bacillus Coagulans-Inulin (Probiotic) 1-250 BILLION-MG CAPS      • Cholecalciferol (Vitamin D3) 50 MCG (2000) CHEW Chew     • "Cyanocobalamin (B-12) 1000 MCG CAPS      • L-Tryptophan 500 MG CAPS      • Lysine 1000 MG TABS      • Multiple Vitamins-Minerals (PRESERVISION AREDS PO) Take by mouth     • NON FORMULARY omega 3 plant based algae oil     • TURMERIC PO Take by mouth       No current facility-administered medications for this visit  Allergies: Allergies   Allergen Reactions   • Naproxen      Annotation - [id number]: The patient felt disoriented   • Sulfa Antibiotics Rash      Physical Exam:     /70 (BP Location: Left arm, Patient Position: Sitting, Cuff Size: Large)   Pulse 68   Temp 97 8 °F (36 6 °C) (Tympanic)   Resp 17   Ht 5' 8\" (1 727 m)   Wt 63 kg (138 lb 12 8 oz)   LMP  (LMP Unknown)   SpO2 97%   BMI 21 10 kg/m²     Physical Exam  Constitutional:       General: She is not in acute distress  Appearance: She is well-developed  She is not diaphoretic  HENT:      Head: Normocephalic and atraumatic  Right Ear: External ear normal       Left Ear: External ear normal       Nose: Nose normal       Mouth/Throat:      Pharynx: No oropharyngeal exudate  Eyes:      General: No scleral icterus  Right eye: No discharge  Left eye: No discharge  Pupils: Pupils are equal, round, and reactive to light  Neck:      Thyroid: No thyromegaly  Vascular: No JVD  Trachea: No tracheal deviation  Cardiovascular:      Rate and Rhythm: Normal rate and regular rhythm  Heart sounds: Normal heart sounds  No murmur heard  No friction rub  No gallop  Pulmonary:      Effort: Pulmonary effort is normal  No respiratory distress  Breath sounds: Normal breath sounds  No wheezing or rales  Chest:      Chest wall: No tenderness  Abdominal:      General: Bowel sounds are normal  There is no distension  Palpations: Abdomen is soft  There is no mass  Tenderness: There is no abdominal tenderness  There is no guarding or rebound  Hernia: No hernia is present   " Musculoskeletal:         General: No tenderness or deformity  Normal range of motion  Cervical back: Normal range of motion and neck supple  Lymphadenopathy:      Cervical: No cervical adenopathy  Skin:     General: Skin is warm and dry  Coloration: Skin is not pale  Findings: No erythema or rash  Neurological:      Mental Status: She is alert and oriented to person, place, and time  Cranial Nerves: No cranial nerve deficit  Sensory: No sensory deficit  Motor: No abnormal muscle tone  Coordination: Coordination normal       Deep Tendon Reflexes: Reflexes normal    Psychiatric:         Behavior: Behavior normal          Thought Content:  Thought content normal           Brielle Fair, DO  [address]

## 2023-05-18 LAB
25(OH)D3+25(OH)D2 SERPL-MCNC: 43 NG/ML (ref 30–100)
ALBUMIN SERPL-MCNC: 4.7 G/DL (ref 3.8–4.8)
ALBUMIN/GLOB SERPL: 2.4 {RATIO} (ref 1.2–2.2)
ALP SERPL-CCNC: 80 IU/L (ref 44–121)
ALT SERPL-CCNC: 15 IU/L (ref 0–32)
APPEARANCE UR: CLEAR
AST SERPL-CCNC: 23 IU/L (ref 0–40)
BASOPHILS # BLD AUTO: 0 X10E3/UL (ref 0–0.2)
BASOPHILS NFR BLD AUTO: 1 %
BILIRUB SERPL-MCNC: 0.4 MG/DL (ref 0–1.2)
BILIRUB UR QL STRIP: NEGATIVE
BUN SERPL-MCNC: 16 MG/DL (ref 8–27)
BUN/CREAT SERPL: 23 (ref 12–28)
CALCIUM SERPL-MCNC: 10.3 MG/DL (ref 8.7–10.3)
CHLORIDE SERPL-SCNC: 106 MMOL/L (ref 96–106)
CHOLEST SERPL-MCNC: 197 MG/DL (ref 100–199)
CHOLEST/HDLC SERPL: 1.5 RATIO (ref 0–4.4)
CO2 SERPL-SCNC: 23 MMOL/L (ref 20–29)
COLOR UR: YELLOW
CREAT SERPL-MCNC: 0.69 MG/DL (ref 0.57–1)
EGFR: 98 ML/MIN/1.73
EOSINOPHIL # BLD AUTO: 0.1 X10E3/UL (ref 0–0.4)
EOSINOPHIL NFR BLD AUTO: 2 %
ERYTHROCYTE [DISTWIDTH] IN BLOOD BY AUTOMATED COUNT: 13.3 % (ref 11.7–15.4)
FERRITIN SERPL-MCNC: 130 NG/ML (ref 15–150)
GLOBULIN SER-MCNC: 2 G/DL (ref 1.5–4.5)
GLUCOSE SERPL-MCNC: 77 MG/DL (ref 70–99)
GLUCOSE UR QL: NEGATIVE
HCT VFR BLD AUTO: 41 % (ref 34–46.6)
HDLC SERPL-MCNC: 130 MG/DL
HGB BLD-MCNC: 13.9 G/DL (ref 11.1–15.9)
HGB UR QL STRIP: NEGATIVE
IMM GRANULOCYTES # BLD: 0 X10E3/UL (ref 0–0.1)
IMM GRANULOCYTES NFR BLD: 0 %
KETONES UR QL STRIP: NEGATIVE
LDLC SERPL CALC-MCNC: 58 MG/DL (ref 0–99)
LDLC SERPL DIRECT ASSAY-MCNC: 54 MG/DL (ref 0–99)
LEUKOCYTE ESTERASE UR QL STRIP: NEGATIVE
LYMPHOCYTES # BLD AUTO: 2.2 X10E3/UL (ref 0.7–3.1)
LYMPHOCYTES NFR BLD AUTO: 45 %
MCH RBC QN AUTO: 33.5 PG (ref 26.6–33)
MCHC RBC AUTO-ENTMCNC: 33.9 G/DL (ref 31.5–35.7)
MCV RBC AUTO: 99 FL (ref 79–97)
MICRO URNS: NORMAL
MONOCYTES # BLD AUTO: 0.4 X10E3/UL (ref 0.1–0.9)
MONOCYTES NFR BLD AUTO: 8 %
NEUTROPHILS # BLD AUTO: 2.1 X10E3/UL (ref 1.4–7)
NEUTROPHILS NFR BLD AUTO: 44 %
NITRITE UR QL STRIP: NEGATIVE
PH UR STRIP: 5 [PH] (ref 5–7.5)
PLATELET # BLD AUTO: 298 X10E3/UL (ref 150–450)
POTASSIUM SERPL-SCNC: 5 MMOL/L (ref 3.5–5.2)
PROT SERPL-MCNC: 6.7 G/DL (ref 6–8.5)
PROT UR QL STRIP: NEGATIVE
RBC # BLD AUTO: 4.15 X10E6/UL (ref 3.77–5.28)
SL AMB VLDL CHOLESTEROL CALC: 9 MG/DL (ref 5–40)
SODIUM SERPL-SCNC: 143 MMOL/L (ref 134–144)
SP GR UR: 1.01 (ref 1–1.03)
TRIGL SERPL-MCNC: 47 MG/DL (ref 0–149)
TSH SERPL DL<=0.005 MIU/L-ACNC: 3.02 UIU/ML (ref 0.45–4.5)
UROBILINOGEN UR STRIP-ACNC: 0.2 MG/DL (ref 0.2–1)
VIT B12 SERPL-MCNC: 1136 PG/ML (ref 232–1245)
WBC # BLD AUTO: 4.8 X10E3/UL (ref 3.4–10.8)

## 2023-11-20 ENCOUNTER — ANNUAL EXAM (OUTPATIENT)
Dept: GYNECOLOGY | Facility: CLINIC | Age: 63
End: 2023-11-20
Payer: COMMERCIAL

## 2023-11-20 VITALS
DIASTOLIC BLOOD PRESSURE: 80 MMHG | SYSTOLIC BLOOD PRESSURE: 132 MMHG | BODY MASS INDEX: 20.07 KG/M2 | HEIGHT: 68 IN | WEIGHT: 132.4 LBS

## 2023-11-20 DIAGNOSIS — Z01.419 ENCOUNTER FOR ANNUAL ROUTINE GYNECOLOGICAL EXAMINATION: Primary | ICD-10-CM

## 2023-11-20 DIAGNOSIS — N95.2 VAGINAL ATROPHY: ICD-10-CM

## 2023-11-20 DIAGNOSIS — N39.3 STRESS INCONTINENCE: ICD-10-CM

## 2023-11-20 PROCEDURE — S0612 ANNUAL GYNECOLOGICAL EXAMINA: HCPCS | Performed by: OBSTETRICS & GYNECOLOGY

## 2023-11-20 NOTE — PROGRESS NOTES
Assessment/Plan:    pap is up to date    mammogram reviewed with her including breast density. Mammogram and ABUS are scheduled and ordered. Will order at next yearly for the following year     Discussed self breast exams    colon cancer screening-colonoscopy in July 2022, recall in 5 years    JUAN -we discussed Kegel's and I reviewed them with her. She was given information on stress incontinence, Kegel's and we briefly discussed treatment    Vaginal atrophy-currently this is asymptomatic as she is not sexually active and she does not have plans for this in the near future. We discussed vaginal moisturizer. We discussed vaginal estrogen if she is considering treatment for the stress incontinence or if she is contemplating sexual activity. discussed preventive care, regular exercise and a healthy diet      No problem-specific Assessment & Plan notes found for this encounter. There are no diagnoses linked to this encounter. Subjective:      Patient ID: [id number] Zavala is a 61 y.o. female. Patient here for yearly. Her stress incontinence continues to be an issue. She is inconsistent with Kegel's. She has started wearing a pad some days. Also, she is not sexually active and has no plans for future sexual activity however she has questions about her dryness. It is essentially asymptomatic. Normal 3D mammogram in November 2022 with dense tissue and average risk, she also had a normal ABUS that day. These are both scheduled  Normal Pap and negative HPV in 2022        The following portions of the patient's history were reviewed and updated as appropriate: allergies, current medications, past family history, past medical history, past social history, past surgical history, and problem list.    Review of Systems   Constitutional: Negative. Gastrointestinal: Negative. Genitourinary: Negative.          Stress incontinence         Objective:      LMP  (LMP Unknown)          Physical Exam  Vitals reviewed. Constitutional:       Appearance: Normal appearance. She is well-developed. Neck:      Thyroid: No thyromegaly. Trachea: No tracheal deviation. Cardiovascular:      Rate and Rhythm: Normal rate and regular rhythm. Pulmonary:      Effort: Pulmonary effort is normal.      Breath sounds: Normal breath sounds. Chest:   Breasts:     Breasts are symmetrical.      Right: No inverted nipple, mass, nipple discharge, skin change or tenderness. Left: No inverted nipple, mass, nipple discharge, skin change or tenderness. Abdominal:      General: There is no distension. Palpations: Abdomen is soft. There is no mass. Tenderness: There is no abdominal tenderness. Genitourinary:     Labia:         Right: No rash, tenderness, lesion or injury. Left: No rash, tenderness, lesion or injury. Vagina: Normal.      Cervix: No cervical motion tenderness, discharge or friability. Adnexa:         Right: No mass, tenderness or fullness. Left: No mass, tenderness or fullness. Rectum: Normal.      Comments: Vagina is atrophic  Levators are weak although she is able to do a Kegel  Neurological:      Mental Status: She is alert.

## 2024-01-05 ENCOUNTER — HOSPITAL ENCOUNTER (OUTPATIENT)
Dept: MAMMOGRAPHY | Facility: CLINIC | Age: 64
Discharge: HOME | End: 2024-01-05
Payer: COMMERCIAL

## 2024-01-05 VITALS — WEIGHT: 132 LBS | HEIGHT: 68 IN | BODY MASS INDEX: 20 KG/M2

## 2024-01-05 DIAGNOSIS — Z12.31 ENCOUNTER FOR SCREENING MAMMOGRAM FOR BREAST CANCER: ICD-10-CM

## 2024-01-05 PROCEDURE — 77063 BREAST TOMOSYNTHESIS BI: CPT

## 2024-01-05 PROCEDURE — 77067 SCR MAMMO BI INCL CAD: CPT

## 2024-01-26 ENCOUNTER — HOSPITAL ENCOUNTER (OUTPATIENT)
Dept: ULTRASOUND IMAGING | Facility: CLINIC | Age: 64
Discharge: HOME | End: 2024-01-26
Payer: COMMERCIAL

## 2024-01-26 DIAGNOSIS — R92.30 DENSE BREAST TISSUE ON MAMMOGRAM: ICD-10-CM

## 2024-01-26 PROCEDURE — 76641 ULTRASOUND BREAST COMPLETE: CPT

## 2024-04-29 ENCOUNTER — OFFICE VISIT (OUTPATIENT)
Dept: FAMILY MEDICINE CLINIC | Facility: CLINIC | Age: 64
End: 2024-04-29
Payer: COMMERCIAL

## 2024-04-29 VITALS
TEMPERATURE: 97.4 F | OXYGEN SATURATION: 99 % | HEART RATE: 56 BPM | RESPIRATION RATE: 15 BRPM | DIASTOLIC BLOOD PRESSURE: 80 MMHG | HEIGHT: 69 IN | BODY MASS INDEX: 19.23 KG/M2 | WEIGHT: 129.8 LBS | SYSTOLIC BLOOD PRESSURE: 120 MMHG

## 2024-04-29 DIAGNOSIS — Z13.0 SCREENING FOR ENDOCRINE, METABOLIC AND IMMUNITY DISORDER: ICD-10-CM

## 2024-04-29 DIAGNOSIS — Z13.6 SCREENING FOR CARDIOVASCULAR CONDITION: ICD-10-CM

## 2024-04-29 DIAGNOSIS — Z13.29 SCREENING FOR ENDOCRINE, METABOLIC AND IMMUNITY DISORDER: ICD-10-CM

## 2024-04-29 DIAGNOSIS — Z00.00 ANNUAL PHYSICAL EXAM: Primary | ICD-10-CM

## 2024-04-29 DIAGNOSIS — E78.2 MIXED HYPERLIPIDEMIA: ICD-10-CM

## 2024-04-29 DIAGNOSIS — M54.12 RIGHT CERVICAL RADICULOPATHY: ICD-10-CM

## 2024-04-29 DIAGNOSIS — Z13.1 SCREENING FOR DIABETES MELLITUS: ICD-10-CM

## 2024-04-29 DIAGNOSIS — Z13.228 SCREENING FOR ENDOCRINE, METABOLIC AND IMMUNITY DISORDER: ICD-10-CM

## 2024-04-29 DIAGNOSIS — M25.511 ACUTE PAIN OF RIGHT SHOULDER: ICD-10-CM

## 2024-04-29 DIAGNOSIS — Z78.9 VEGAN DIET: ICD-10-CM

## 2024-04-29 DIAGNOSIS — M79.601 RIGHT ARM PAIN: ICD-10-CM

## 2024-04-29 PROBLEM — R19.4 CHANGE IN BOWEL HABITS: Status: RESOLVED | Noted: 2022-12-30 | Resolved: 2024-04-29

## 2024-04-29 PROBLEM — B00.89 HERPES DERMATITIS: Status: RESOLVED | Noted: 2023-04-14 | Resolved: 2024-04-29

## 2024-04-29 PROCEDURE — 3725F SCREEN DEPRESSION PERFORMED: CPT | Performed by: FAMILY MEDICINE

## 2024-04-29 PROCEDURE — 99396 PREV VISIT EST AGE 40-64: CPT | Performed by: FAMILY MEDICINE

## 2024-04-29 NOTE — PROGRESS NOTES
ADULT ANNUAL PHYSICAL  [facility] PRACTICE    NAME: Nakia Adler  AGE: 63 y.o. SEX: female  : 1960     DATE: 2024     Assessment and Plan:     Problem List Items Addressed This Visit        Other    Hyperlipidemia    Relevant Orders    CBC and differential    Comprehensive metabolic panel    LDL cholesterol, direct    Lipid panel    TSH, 3rd generation with Free T4 reflex    UA (URINE) with reflex to Scope    Vitamin D 25 hydroxy    Vegan diet    Relevant Orders    CBC and differential    Comprehensive metabolic panel    LDL cholesterol, direct    Lipid panel    TSH, 3rd generation with Free T4 reflex    UA (URINE) with reflex to Scope    Vitamin D 25 hydroxy   Other Visit Diagnoses     Annual physical exam    -  Primary    Relevant Orders    CBC and differential    Comprehensive metabolic panel    LDL cholesterol, direct    Lipid panel    TSH, 3rd generation with Free T4 reflex    UA (URINE) with reflex to Scope    Vitamin D 25 hydroxy    Screening for cardiovascular condition        Relevant Orders    CBC and differential    Comprehensive metabolic panel    LDL cholesterol, direct    Lipid panel    TSH, 3rd generation with Free T4 reflex    UA (URINE) with reflex to Scope    Vitamin D 25 hydroxy    Screening for diabetes mellitus        Relevant Orders    CBC and differential    Comprehensive metabolic panel    LDL cholesterol, direct    Lipid panel    TSH, 3rd generation with Free T4 reflex    UA (URINE) with reflex to Scope    Vitamin D 25 hydroxy    Screening for endocrine, metabolic and immunity disorder        Relevant Orders    CBC and differential    Comprehensive metabolic panel    LDL cholesterol, direct    Lipid panel    TSH, 3rd generation with Free T4 reflex    UA (URINE) with reflex to Scope    Vitamin D 25 hydroxy    Right cervical radiculopathy        Relevant Orders    XR spine cervical complete 4 or 5 vw non injury    XR  shoulder 2+ vw right    Ambulatory Referral to Physical Therapy    Right arm pain        Relevant Orders    XR spine cervical complete 4 or 5 vw non injury    XR shoulder 2+ vw right    Ambulatory Referral to Physical Therapy    Acute pain of right shoulder        Relevant Orders    XR spine cervical complete 4 or 5 vw non injury    XR shoulder 2+ vw right    Ambulatory Referral to Physical Therapy      The patient had a normal exam today in the office.  She is experiencing radiculopathy type symptoms down her right upper extremity which I believe is originating from her neck.  She does have some discomfort in her shoulder however.  We will send her for the x-rays as indicated and try a course of physical therapy to help with her symptoms.  She will go for the lab work as ordered also and we will follow-up with the results when available.  She will continue to work on improving her diet and exercise.  We will see her back in the office as scheduled.  She is current with her vaccinations.      Immunizations and preventive care screenings were discussed with patient today. Appropriate education was printed on patient's after visit summary.    Counseling:  Dental Health: discussed importance of regular tooth brushing, flossing, and dental visits.  Injury prevention: discussed safety/seat belts, safety helmets, smoke detectors, carbon dioxide detectors, and smoking near bedding or upholstery.  Exercise: the importance of regular exercise/physical activity was discussed. Recommend exercise 3-5 times per week for at least 30 minutes.       Depression Screening and Follow-up Plan: Patient was screened for depression during today's encounter. They screened negative with a PHQ-2 score of 0.        Return in about 1 year (around 4/29/2025) for Annual physical.     Chief Complaint:     Chief Complaint   Patient presents with   • Annual Exam     Complete Physical      History of Present Illness:     Adult Annual Physical    Patient here for a comprehensive physical exam. The patient reports no problems.  Nakia Adler is a 63 y.o. female who presents today for a complete physical. she   has been feeling well and has no complaints today.  The patient denies any chest pain, shortness of breath, or palpitations.  There is no edema.  There are no headaches or visual changes.  There is no lightheadedness, dizziness, or fainting spells.  There are no GI symptoms.  The patient goes for dental exams every 6 months and sees her eye doctor.  The patient is watching her diet and follows a regular exercise program.   She is having a problem with her right shoulder, arm and hand for a long time. There is pain in her right hand in the morning relieved with activity.  There is numbness down the right arm and neck soreness.  There is no locking in the shoulder.  She has not gone to PT yet.  There is no swelling. She is not having to take anything.  It is better as the day goes on.    She has cut out the salt.      Diet and Physical Activity  Diet/Nutrition: well balanced diet, consuming 3-5 servings of fruits/vegetables daily, and Vegan diet .   Exercise: walking, moderate cardiovascular exercise, 5-7 times a week on average, and yoga- walks 6 miles a day .      Depression Screening  PHQ-2/9 Depression Screening    Little interest or pleasure in doing things: 0 - not at all  Feeling down, depressed, or hopeless: 0 - not at all  PHQ-2 Score: 0  PHQ-2 Interpretation: Negative depression screen       General Health  Sleep: sleeps well.   Hearing: normal - bilateral.  Vision: no vision problems, goes for regular eye exams, most recent eye exam <1 year ago, and wears contacts.   Dental: regular dental visits and brushes teeth twice daily.       /GYN Health  Follows with gynecology? yes -Dr. Reyna  Patient is: postmenopausal  Current with mammogram..    Advanced Care Planning  Do you have an advanced directive? yes  Do you have a durable medical  power of ? yes  ACP document given to the patient? no     Review of Systems:     Review of Systems   Constitutional: Negative.    HENT: Negative.     Eyes: Negative.    Respiratory: Negative.     Cardiovascular: Negative.    Gastrointestinal: Negative.    Endocrine: Negative.    Genitourinary: Negative.    Musculoskeletal: Negative.    Skin: Negative.    Allergic/Immunologic: Negative.    Neurological: Negative.    Hematological: Negative.    Psychiatric/Behavioral: Negative.        Past Medical History:     Past Medical History:   Diagnosis Date   • Abnormal Pap smear of cervix     details in separate report   • Asymptomatic bacteriuria     Last Assessed: 2013    • Contusion of left chest wall     Last Assessed: 10/20/2015    • Elevated ALT measurement     Last Assessed: 2013   • GERD (gastroesophageal reflux disease)    • Herpes since childhood    on mouth, lips   • Irritable bowel syndrome    • Right lateral epicondylitis     Last Assessed: 2013       Past Surgical History:     Past Surgical History:   Procedure Laterality Date   • BREAST BIOPSY Left     EXCISIONAL BIOPSY BENIGN   • BREAST LUMPECTOMY     • COLONOSCOPY  2012    normal mucosa throughout the whole colon, grade 2 internal hemorrhoids, 10 year recall 2022    • NO PAST SURGERIES        Social History:     Social History     Socioeconomic History   • Marital status:      Spouse name: None   • Number of children: None   • Years of education: None   • Highest education level: None   Occupational History   • None   Tobacco Use   • Smoking status: Former     Current packs/day: 0.00     Average packs/day: 1 pack/day for 5.0 years (5.0 ttl pk-yrs)     Types: Cigarettes     Start date: 1978     Quit date: 1983     Years since quittin.3   • Smokeless tobacco: Never   • Tobacco comments:     Per [employer]: Former Smoker - remote history.     Vaping Use   • Vaping status: Never Used   Substance and  Sexual Activity   • Alcohol use: Yes     Alcohol/week: 7.0 standard drinks of alcohol     Types: 7 Glasses of wine per week     Comment: social, Occasional Alcohol Use     • Drug use: No   • Sexual activity: Not Currently     Partners: Male     Birth control/protection: None   Other Topics Concern   • None   Social History Narrative    Consumes a vegan diet         Retired     Social Determinants of Health     Financial Resource Strain: Low Risk  (4/19/2021)    Overall Financial Resource Strain (CARDIA)    • Difficulty of Paying Living Expenses: Not hard at all   Food Insecurity: No Food Insecurity (4/19/2021)    Hunger Vital Sign    • Worried About Running Out of Food in the Last Year: Never true    • Ran Out of Food in the Last Year: Never true   Transportation Needs: No Transportation Needs (4/19/2021)    PRAPARE - Transportation    • Lack of Transportation (Medical): No    • Lack of Transportation (Non-Medical): No   Physical Activity: Sufficiently Active (6/8/2022)    Exercise Vital Sign    • Days of Exercise per Week: 7 days    • Minutes of Exercise per Session: 30 min   Stress: No Stress Concern Present (6/8/2022)    [employer] [place] [employer] - Occupational Stress Questionnaire    • Feeling of Stress : Not at all   Social Connections: Unknown (4/19/2021)    Social Connection and Isolation Panel [NHANES]    • Frequency of Communication with Friends and Family: Not on file    • Frequency of Social Gatherings with Friends and Family: Not on file    • Attends Yazidism Services: Not on file    • Active Member of Clubs or Organizations: Not on file    • Attends Club or Organization Meetings: Not on file    • Marital Status:    Intimate Partner Violence: Not At Risk (4/24/2023)    Humiliation, Afraid, Rape, and Kick questionnaire    • Fear of Current or Ex-Partner: No    • Emotionally Abused: No    • Physically Abused: No    • Sexually Abused: No   Housing Stability: Not on file       "Family History:     Family History   Problem Relation Age of Onset   • Fibromyalgia Mother    • Leukemia Mother    • Arthritis Mother    • Diabetes Father    • No Known Problems Sister    • No Known Problems Sister    • Cervical cancer Maternal Grandmother    • Cancer Maternal Grandmother         female cancer   • No Known Problems Maternal Grandfather    • Colon cancer Paternal Grandmother    • Cancer Paternal Grandmother    • No Known Problems Paternal Grandfather    • No Known Problems Brother    • Kidney disease Paternal Aunt         Polycystic disease throughout famiky   • Breast cancer Cousin 40   • Breast cancer Cousin 45   • Colon polyps Neg Hx       Current Medications:     Current Outpatient Medications   Medication Sig Dispense Refill   • Bacillus Coagulans-Inulin (Probiotic) 1-250 BILLION-MG CAPS      • Cholecalciferol (Vitamin D3) 50 MCG (2000 UT) CHEW Chew     • Cyanocobalamin (B-12) 1000 MCG CAPS      • L-Tryptophan 500 MG CAPS      • Lysine 1000 MG TABS      • NON FORMULARY omega 3 plant based algae oil     • TURMERIC PO Take by mouth       No current facility-administered medications for this visit.      Allergies:     Allergies   Allergen Reactions   • Naproxen      Annotation - 16Saq2016: The patient felt disoriented   • Sulfa Antibiotics Rash      Physical Exam:     /80 (BP Location: Right arm, Patient Position: Sitting, Cuff Size: Standard)   Pulse 56   Temp (!) 97.4 °F (36.3 °C) (Tympanic)   Resp 15   Ht 5' 9.2\" (1.758 m)   Wt 58.9 kg (129 lb 12.8 oz)   LMP  (LMP Unknown)   SpO2 99%   BMI 19.06 kg/m²     Physical Exam  Constitutional:       General: She is not in acute distress.     Appearance: Normal appearance. She is well-developed. She is not diaphoretic.   HENT:      Head: Normocephalic and atraumatic.      Right Ear: Tympanic membrane, ear canal and external ear normal.      Left Ear: Tympanic membrane, ear canal and external ear normal.      Nose: Nose normal.      " Mouth/Throat:      Mouth: Mucous membranes are moist.      Pharynx: Oropharynx is clear. No oropharyngeal exudate.   Eyes:      General: No scleral icterus.        Right eye: No discharge.         Left eye: No discharge.      Extraocular Movements: Extraocular movements intact.      Pupils: Pupils are equal, round, and reactive to light.   Neck:      Thyroid: No thyromegaly.      Vascular: No JVD.      Trachea: No tracheal deviation.   Cardiovascular:      Rate and Rhythm: Normal rate and regular rhythm.      Heart sounds: Normal heart sounds. No murmur heard.     No friction rub. No gallop.   Pulmonary:      Effort: Pulmonary effort is normal. No respiratory distress.      Breath sounds: Normal breath sounds. No wheezing or rales.   Chest:      Chest wall: No tenderness.   Abdominal:      General: Bowel sounds are normal. There is no distension.      Palpations: Abdomen is soft. There is no mass.      Tenderness: There is no abdominal tenderness. There is no guarding or rebound.      Hernia: No hernia is present.   Musculoskeletal:         General: Tenderness present. No deformity. Normal range of motion.      Right shoulder: Tenderness and bony tenderness present. No swelling, deformity, effusion, laceration or crepitus. Normal range of motion. Normal strength. Normal pulse.      Cervical back: Normal range of motion and neck supple. No swelling, edema, deformity, erythema, signs of trauma, lacerations, rigidity, spasms, torticollis, tenderness, bony tenderness or crepitus. No pain with movement. Normal range of motion.   Lymphadenopathy:      Cervical: No cervical adenopathy.   Skin:     General: Skin is warm and dry.      Coloration: Skin is not pale.      Findings: No erythema or rash.   Neurological:      Mental Status: She is alert and oriented to person, place, and time.      Cranial Nerves: No cranial nerve deficit.      Sensory: No sensory deficit.      Motor: No abnormal muscle tone.      Coordination:  Coordination normal.      Deep Tendon Reflexes: Reflexes normal.   Psychiatric:         Mood and Affect: Mood normal.         Behavior: Behavior normal.         Thought Content: Thought content normal.         Judgment: Judgment normal.          Heather Gama DO  [facility]

## 2024-05-03 ENCOUNTER — TELEPHONE (OUTPATIENT)
Age: 64
End: 2024-05-03

## 2024-05-03 NOTE — TELEPHONE ENCOUNTER
Patient is requesting an insurance referral for the following specialty:      Test Name / Order Name: Eval and treat    DX Code: eval and treat    Date Of Service: 5/6/24    Location/Facility Name/Address/Phone #: [address], [place] [address] ·  [phone]    Leobardo Woody DC    Location / Facility NPI: [id number]     [facility] Phone # To Reach The Patient: [phone]        NEEDS ASAP, APPT ON MONDAY

## 2024-05-30 ENCOUNTER — APPOINTMENT (OUTPATIENT)
Dept: RADIOLOGY | Facility: CLINIC | Age: 64
End: 2024-05-30
Payer: COMMERCIAL

## 2024-05-30 DIAGNOSIS — M79.601 RIGHT ARM PAIN: ICD-10-CM

## 2024-05-30 DIAGNOSIS — M54.12 RIGHT CERVICAL RADICULOPATHY: ICD-10-CM

## 2024-05-30 DIAGNOSIS — M25.511 ACUTE PAIN OF RIGHT SHOULDER: ICD-10-CM

## 2024-05-30 PROCEDURE — 73030 X-RAY EXAM OF SHOULDER: CPT

## 2024-05-30 PROCEDURE — 72050 X-RAY EXAM NECK SPINE 4/5VWS: CPT

## 2024-06-05 ENCOUNTER — TELEPHONE (OUTPATIENT)
Age: 64
End: 2024-06-05

## 2024-06-05 NOTE — TELEPHONE ENCOUNTER
[facility] in [place] called requesting an Insurance referral for patient     Appt date 6/11/24  NPI: [id number]  DX: M79.601  M54.12  M25.511    Please put in [facility]  Thank you

## 2024-06-10 NOTE — PROGRESS NOTES
PT Evaluation     Today's date: 2024  Patient name: Nakia Adler  : 1960  MRN: [id number]  Referring provider: Heather Gama DO  Dx:   Encounter Diagnosis     ICD-10-CM    1. Right cervical radiculopathy  M54.12 Ambulatory Referral to Physical Therapy      2. Right arm pain  M79.601 Ambulatory Referral to Physical Therapy      3. Acute pain of right shoulder  M25.511 Ambulatory Referral to Physical Therapy          Start Time: 1340  Stop Time: 1415  Total time in clinic (min): 35 minutes    Assessment  Impairments: abnormal muscle tone, impaired physical strength, lacks appropriate home exercise program and pain with function  Other impairment: Difficulty with sleep/positioning due to pain  Irritability comments: Low to moderate (worse in morning)    Assessment details: The pt is a 64 year old female who presents to skilled physical therapy services due to a decline in functional status related to neck pain and what presents as right-sided cervical radiculopathy. Upon completion of the IE, the pt presents with impairments in pain, muscle tension, palpation tenderness, neural tension, and pain with cervical AROM. As a result of these impairments, the pt has difficulty with the following functional activities: sleeping, neck mobility/scanning environment, and gripping with R hand (primarily in morning). POC will include manual therapy for flexibility/mobility and symptom modulation, modalities (PRN), TE/TA/NR interventions for functional strength and neuromuscular control, HEP, and pt education. The pt will continue to benefit from skilled physical therapy services to address impairments in order to return to her PLOF without limitations due to pain.    Thank you for the referral.  Understanding of Dx/Px/POC: good     Prognosis: good    Goals  STM: Achieve within 4 weeks  - Decrease symptom severity to no greater than 2/10 pain.  - The pt will demonstrate improvement in cervical AROM in order to  "participate in ADLs/IADLs with no greater than 2/10 pain.  - The pt will be independent with an HEP to aid in prognosis and symptom/treatment progression over the POC.  - Able to sleep through the night without waking pain in order to improve quality of life.    LTG: Achieve within 8 weeks  - Increase FOTO Functional Status measure score to at least benchmark in order to demonstrate improvements in functional mobility and activity capacity.  - Progress to at least 90% functional normal as per pt's self-report in order to demonstrate return to PLOF.  - The pt will be independent with an HEP for management of symptoms and functional mobility.    Plan  Patient would benefit from: skilled physical therapy  Referral necessary: Yes  Planned modality interventions: thermotherapy: hydrocollator packs    Planned therapy interventions: IASTM, joint mobilization, manual therapy, nerve gliding, neuromuscular re-education, patient/caregiver education, strengthening, stretching, flexibility, therapeutic activities, therapeutic exercise, functional ROM exercises and home exercise program    Frequency: 1x week  Plan of Care beginning date: 6/11/2024  Plan of Care expiration date: 8/2/2024  Treatment plan discussed with: patient        Subjective Evaluation    History of Present Illness  Mechanism of injury: Onset of symptoms \"a few months ago.\" Symptoms include: tingling/numbness down RUE, weakness in R hand (particularly first thing in morning with progressive improvement over morning/day), stiffness/tension in left side of neck. Pt is unable to identify a particular injury/cause for the onset of symptoms. She denies any recent falls, car accidents, and/or injuries to neck or shoulders. Symptoms wake her up at night and disturb her sleep. Pt also reports occasional tension/discomfort in upper back.    Recent treatments for symptoms (some improvement in symptoms, though not complete relief): Chiropractor, gentle neck and shoulder " "exercises/stretches, changes in desk ergonomics        Patient Goals  Patient goals for therapy: decreased pain  Patient goal: Improve sleep  Pain  Current pain ratin  At best pain ratin  At worst pain ratin  Location: RUE; left side of neck; upper back  Quality: needle-like, pulling, discomfort, radiating and tight  Alleviating factors: Chiropractor.  Exacerbated by: Sleeping; /opening jars w/ R hand in morning.      Diagnostic Tests    FCE comments: Cervical X-ray (2024):   \"Moderate multilevel spondylosis in the lower cervical spine with mild neuroforaminal narrowing bilaterally\"    R Shoulder (2024):  \"No acute osseous abnormality.\"  Treatments  Previous treatment: chiropractic  Current treatment: physical therapy        Objective     Concurrent Complaints  Positive for disturbed sleep. Negative for dizziness, headaches, nausea/motion sickness, trouble swallowing and visual change    Static Posture     Shoulders  Rounded.    Palpation   Left   Tenderness of the cervical paraspinals, scalenes, sternocleidomastoid, suboccipitals and upper trapezius.   Trigger point to scalenes.     Right   Tenderness of the cervical paraspinals.     Tenderness   Cervical Spine   No tenderness in the spinous process.     Active Range of Motion   Cervical/Thoracic Spine       Cervical  Subcranial protraction:  WFL   Subcranial retraction:  WFL   Flexion:  WFL  Extension:  WFL  Left lateral flexion:  Restriction level: moderate  Right lateral flexion: Neck active lateral bend right: Left side pain.     with pain Restriction level moderate  Left rotation: Neck active rotation left: Pain left side of neck. with pain Restriction level: moderate  Right rotation: Neck active rotation right: Left side pain.    with pain Restriction level: moderate    Thoracic    Flexion:  WFL  Extension:  WFL  Left lateral flexion:  WFL  Right lateral flexion:  WFL  Left rotation:  WFL  Right rotation:  WFL    Lumbar   Flexion:  " Restriction level: minimal  Extension:  WFL  Left lateral flexion:  WFL  Right lateral flexion:  WFL  Left rotation:  WFL  Right rotation:  WFL    Additional Active Range of Motion Details  No pain with thoracic or lumbar AROM    Joint Play     Hypomobile: C3, C4, C5, C6, C7, T1 and T2     Tests   Cervical   Negative vertical compression, cervical distraction (Symptom relief) and Sharp-Jeni test.     Left   Negative Spurling's Test A.     Right   Negative Spurling's Test A.     Right Shoulder   Positive ULTT1 and ULTT3.   Negative ULTT4.     Lumbar   Negative vertical compression.     Ambulation   Weight-Bearing Status   Weight-Bearing Status (Left): full weight bearing   Weight-Bearing Status (Right): full weight-bearing    Assistive device used: none  Neuro Exam:     Headaches   Patient reports headaches: No.       Flowsheet Rows      Flowsheet Row Most Recent Value   PT/OT G-Codes    Current Score 68   Projected Score 70               Precautions: N/A       6/11            Manuals             STM/MFR KS            Gentle Cervical Distraction KS            UT stretch w/ R shld depression KS                                      Neuro Re-Ed             Median Nerve Glide - Supine 10x            Radial Nerve Glide - Standing 10x                                                                             Ther Ex             Open Book 12x (R rotation)                                      POC; Examination Findings KS            Anatomy as it relates to pt's symptoms/presentation KS            Address pt's questions PRN KS            HEP KS                         Ther Activity                                       Gait Training                                       Modalities

## 2024-06-11 ENCOUNTER — EVALUATION (OUTPATIENT)
Dept: PHYSICAL THERAPY | Facility: CLINIC | Age: 64
End: 2024-06-11
Payer: COMMERCIAL

## 2024-06-11 DIAGNOSIS — M79.601 RIGHT ARM PAIN: ICD-10-CM

## 2024-06-11 DIAGNOSIS — M54.12 RIGHT CERVICAL RADICULOPATHY: ICD-10-CM

## 2024-06-11 DIAGNOSIS — M25.511 ACUTE PAIN OF RIGHT SHOULDER: ICD-10-CM

## 2024-06-11 PROCEDURE — 97140 MANUAL THERAPY 1/> REGIONS: CPT

## 2024-06-11 PROCEDURE — 97161 PT EVAL LOW COMPLEX 20 MIN: CPT

## 2024-06-20 ENCOUNTER — APPOINTMENT (OUTPATIENT)
Dept: PHYSICAL THERAPY | Facility: CLINIC | Age: 64
End: 2024-06-20
Payer: COMMERCIAL

## 2024-06-27 ENCOUNTER — APPOINTMENT (OUTPATIENT)
Dept: PHYSICAL THERAPY | Facility: CLINIC | Age: 64
End: 2024-06-27
Payer: COMMERCIAL

## 2024-09-04 ENCOUNTER — APPOINTMENT (OUTPATIENT)
Dept: RADIOLOGY | Facility: CLINIC | Age: 64
End: 2024-09-04
Payer: COMMERCIAL

## 2024-09-04 ENCOUNTER — OFFICE VISIT (OUTPATIENT)
Dept: FAMILY MEDICINE CLINIC | Facility: CLINIC | Age: 64
End: 2024-09-04
Payer: COMMERCIAL

## 2024-09-04 VITALS
DIASTOLIC BLOOD PRESSURE: 76 MMHG | TEMPERATURE: 97.6 F | BODY MASS INDEX: 19.28 KG/M2 | SYSTOLIC BLOOD PRESSURE: 120 MMHG | HEIGHT: 69 IN | OXYGEN SATURATION: 98 % | RESPIRATION RATE: 17 BRPM | HEART RATE: 62 BPM | WEIGHT: 130.2 LBS

## 2024-09-04 DIAGNOSIS — M19.041 PRIMARY OSTEOARTHRITIS OF RIGHT HAND: ICD-10-CM

## 2024-09-04 DIAGNOSIS — R20.0 NUMBNESS AND TINGLING IN RIGHT HAND: ICD-10-CM

## 2024-09-04 DIAGNOSIS — R20.2 NUMBNESS AND TINGLING IN RIGHT HAND: ICD-10-CM

## 2024-09-04 DIAGNOSIS — M19.041 PRIMARY OSTEOARTHRITIS OF RIGHT HAND: Primary | ICD-10-CM

## 2024-09-04 PROCEDURE — 73130 X-RAY EXAM OF HAND: CPT

## 2024-09-04 PROCEDURE — 73110 X-RAY EXAM OF WRIST: CPT

## 2024-09-04 PROCEDURE — 99213 OFFICE O/P EST LOW 20 MIN: CPT | Performed by: FAMILY MEDICINE

## 2024-09-04 NOTE — PROGRESS NOTES
Ambulatory Visit  Name: Nakia Adler      : 1960      MRN: [id number]  Encounter Provider: Heather Gama DO  Encounter Date: 2024   Encounter department: [facility]    Assessment & Plan   1. Primary osteoarthritis of right hand  Assessment & Plan:  I believe that the patient has underlying arthritis of the right handsand could possibly have some carpal tunnel syndrome causing the numbness in her right hand as well.  We will send her for the x-ray as indicated and I am going to refer her to our orthopedic hand specialist for further evaluation and treatment.  She could try Biofreeze or Aspercreme over-the-counter to help with the pain and inflammation.  She can also try extra-strength Tylenol as needed along with intermittent heat and ice.  We will see her back as scheduled.  Orders:  -     XR hand 2 vw right; Future; Expected date: 2024  -     Ambulatory Referral to Orthopedic Surgery; Future  -     XR wrist 3+ vw right; Future; Expected date: 2024  2. Numbness and tingling in right hand  -     XR hand 2 vw right; Future; Expected date: 2024  -     Ambulatory Referral to Orthopedic Surgery; Future  -     XR wrist 3+ vw right; Future; Expected date: 2024     Chief Complaint   Patient presents with   • Hand Pain     Pt c/o aching and tingling in right hand. Ongoing. Difficult making a fist.        History of Present Illness     Nakia Adler is a 64 y.o. female presents today for evaluation of aching and tingling in her right hand and difficulty making a fist for months.  She was having problems with the right shoulder and neck and had PT and she had an improvement in the neck and shoulder, but there is persistent problems with the right hand.  There are no recent x-rays of the righ hand- There is no swelling and it feels cold.  There are some Raynaud's syndrome in the hand with cold- but that I on going.  There is stiffness in the hand in the am.   There was a right wrist fracture and hand fracture in the past.  It wakes her up at night.    The shoulder and arm are better.    She can't close her hand.     Hand Pain   The incident occurred more than 1 week ago. The pain is present in the right hand. The quality of the pain is described as aching and cramping. The pain does not radiate. The pain is at a severity of 5/10. The pain is moderate. The pain has been Constant since the incident. Associated symptoms include numbness. Pertinent negatives include no chest pain, muscle weakness or tingling. Nothing aggravates the symptoms. She has tried nothing for the symptoms. The treatment provided no relief.       Review of Systems   Constitutional: Negative.    HENT: Negative.     Eyes: Negative.    Respiratory: Negative.     Cardiovascular: Negative.  Negative for chest pain.   Gastrointestinal: Negative.    Endocrine: Negative.    Genitourinary: Negative.    Musculoskeletal:  Positive for arthralgias.   Skin: Negative.    Allergic/Immunologic: Negative.    Neurological:  Positive for numbness. Negative for tingling.   Hematological: Negative.    Psychiatric/Behavioral: Negative.       Medical History Reviewed by provider this encounter:  Tobacco  Allergies  Meds  Problems  Med Hx  Surg Hx  Fam Hx       Current Outpatient Medications on File Prior to Visit   Medication Sig Dispense Refill   • Bacillus Coagulans-Inulin (Probiotic) 1-250 BILLION-MG CAPS      • Cholecalciferol (Vitamin D3) 50 MCG (2000 UT) CHEW Chew     • Cyanocobalamin (B-12) 1000 MCG CAPS      • L-Tryptophan 500 MG CAPS      • Lysine 1000 MG TABS      • NON FORMULARY omega 3 plant based algae oil     • TURMERIC PO Take by mouth     • [DISCONTINUED] polyethylene glycol (GOLYTELY) 4000 mL solution Take 4,000 mL by mouth once for 1 dose 4000 mL 0     No current facility-administered medications on file prior to visit.      Social History     Tobacco Use   • Smoking status: Former     Current  "packs/day: 0.00     Average packs/day: 1 pack/day for 5.0 years (5.0 ttl pk-yrs)     Types: Cigarettes     Start date: 1978     Quit date: 1983     Years since quittin.7   • Smokeless tobacco: Never   • Tobacco comments:     Per [employer]: Former Smoker - remote history.     Vaping Use   • Vaping status: Never Used   Substance and Sexual Activity   • Alcohol use: Yes     Alcohol/week: 7.0 standard drinks of alcohol     Types: 7 Glasses of wine per week     Comment: social, Occasional Alcohol Use     • Drug use: No   • Sexual activity: Not Currently     Partners: Male     Birth control/protection: None     Objective     /76 (BP Location: Left arm, Patient Position: Sitting, Cuff Size: Standard)   Pulse 62   Temp 97.6 °F (36.4 °C) (Tympanic)   Resp 17   Ht 5' 9.2\" (1.758 m)   Wt 59.1 kg (130 lb 3.2 oz)   LMP  (LMP Unknown)   SpO2 98%   BMI 19.12 kg/m²     Physical Exam  Constitutional:       General: She is not in acute distress.     Appearance: Normal appearance. She is well-developed. She is not diaphoretic.   HENT:      Head: Normocephalic and atraumatic.      Right Ear: Tympanic membrane, ear canal and external ear normal.      Left Ear: Tympanic membrane, ear canal and external ear normal.      Nose: Nose normal.      Mouth/Throat:      Mouth: Mucous membranes are moist.      Pharynx: Oropharynx is clear. No oropharyngeal exudate.   Eyes:      General: No scleral icterus.        Right eye: No discharge.         Left eye: No discharge.      Extraocular Movements: Extraocular movements intact.      Pupils: Pupils are equal, round, and reactive to light.   Neck:      Thyroid: No thyromegaly.      Vascular: No JVD.      Trachea: No tracheal deviation.   Cardiovascular:      Rate and Rhythm: Normal rate and regular rhythm.      Heart sounds: Normal heart sounds. No murmur heard.     No friction rub. No gallop.   Pulmonary:      Effort: Pulmonary effort is normal. No respiratory distress.    "   Breath sounds: Normal breath sounds. No wheezing or rales.   Chest:      Chest wall: No tenderness.   Abdominal:      General: Bowel sounds are normal. There is no distension.      Palpations: Abdomen is soft. There is no mass.      Tenderness: There is no abdominal tenderness. There is no guarding or rebound.      Hernia: No hernia is present.   Musculoskeletal:         General: Tenderness present. No swelling or deformity. Normal range of motion.      Right hand: Bony tenderness present. No swelling, deformity, lacerations or tenderness. Normal range of motion. Normal sensation. There is no disruption of two-point discrimination. Normal capillary refill. Normal pulse.      Left hand: Normal.      Cervical back: Normal range of motion and neck supple.      Comments: The patient does have difficulty making a right fist.   Lymphadenopathy:      Cervical: No cervical adenopathy.   Skin:     General: Skin is warm and dry.      Coloration: Skin is not pale.      Findings: No erythema or rash.   Neurological:      Mental Status: She is alert and oriented to person, place, and time.      Cranial Nerves: No cranial nerve deficit.      Sensory: No sensory deficit.      Motor: No abnormal muscle tone.      Coordination: Coordination normal.      Deep Tendon Reflexes: Reflexes normal.   Psychiatric:         Mood and Affect: Mood normal.         Behavior: Behavior normal.         Thought Content: Thought content normal.         Judgment: Judgment normal.       Administrative Statements   I have spent a total time of 20 minutes in caring for this patient on the day of the visit/encounter including Prognosis, Importance of tx compliance, Documenting in the medical record, Reviewing / ordering tests, medicine, procedures  , and Obtaining or reviewing history  .

## 2024-09-04 NOTE — ASSESSMENT & PLAN NOTE
I believe that the patient has underlying arthritis of the right handsand could possibly have some carpal tunnel syndrome causing the numbness in her right hand as well.  We will send her for the x-ray as indicated and I am going to refer her to our orthopedic hand specialist for further evaluation and treatment.  She could try Biofreeze or Aspercreme over-the-counter to help with the pain and inflammation.  She can also try extra-strength Tylenol as needed along with intermittent heat and ice.  We will see her back as scheduled.

## 2024-10-08 ENCOUNTER — TELEPHONE (OUTPATIENT)
Dept: OBGYN CLINIC | Facility: CLINIC | Age: 64
End: 2024-10-08

## 2024-10-08 NOTE — TELEPHONE ENCOUNTER
LVM stating patient will be seeing rosario Cook's PA on 10/14 same date and time. Provided number if patient needs to reschedule.

## 2024-10-14 ENCOUNTER — OFFICE VISIT (OUTPATIENT)
Dept: OBGYN CLINIC | Facility: CLINIC | Age: 64
End: 2024-10-14
Payer: COMMERCIAL

## 2024-10-14 VITALS — BODY MASS INDEX: 19.26 KG/M2 | HEIGHT: 69 IN | WEIGHT: 130 LBS

## 2024-10-14 DIAGNOSIS — R20.2 NUMBNESS AND TINGLING IN RIGHT HAND: ICD-10-CM

## 2024-10-14 DIAGNOSIS — M19.041 PRIMARY OSTEOARTHRITIS OF RIGHT HAND: ICD-10-CM

## 2024-10-14 DIAGNOSIS — R20.0 NUMBNESS AND TINGLING IN RIGHT HAND: ICD-10-CM

## 2024-10-14 PROCEDURE — 99213 OFFICE O/P EST LOW 20 MIN: CPT | Performed by: PHYSICIAN ASSISTANT

## 2024-10-14 NOTE — PROGRESS NOTES
ASSESSMENT/PLAN:    Assessment:   Right carpal tunnel syndrome  Possible cervical radiculopathy    Plan:   X-rays reviewed with the patient  She does have some degenerative disc disease noted of the cervical spine  Will order an EMG of the right upper extremity to evaluate for carpal tunnel as well as cervical radiculopathy  She was advised to wear her brace at nighttime  She will follow-up after EMG to go over test results    Follow Up:  After Testing    To Do Next Visit:  Reevaluation    General Discussions:  Carpal Tunnel Syndrome: The anatomy and physiology of carpal tunnel syndrome was discussed with the patient today.  Increase pressure localized under the transverse carpal ligament can cause pain, numbness, tingling, or dysesthesias within the median nerve distribution as well as feelings of fatigue, clumsiness, or awkwardness.  These symptoms typically occur at night and worse in the morning upon waking.  Eventually, untreated carpal tunnel syndrome can result in weakness and permanent loss of muscle within the thenar compartment of the hand.  Treatment options were discussed with the patient.  Conservative treatment includes nocturnal resting splints to keep the nerve in a neutral position, ergonomic changes within the work or home environment, activity modification, and tendon gliding exercises.  Steroid injections within the carpal canal can help a majority of patients, however this is often self-limited in a majority of patients.  Surgical intervention to divide the transverse carpal ligament typically results in a long-lasting relief of the patient's complaints, with the recurrence rate of less than 1%.         _____________________________________________________  CHIEF COMPLAINT:  Chief Complaint   Patient presents with    Right Hand - Numbness, Pain     XR - 9/4/24         SUBJECTIVE:  Nakia Adler is a 64 y.o. female who presents right hand numbness and tingling into the thumb, index and long  finger.  She states that she has been having issues with her elbow, shoulder and neck as well.  She has previously had x-rays done of these body parts.  She states that she will have numbness and tingling and pain that wakes her from sleep at night.  She has tried the use of a brace for 1 day without relief of her symptoms.  She denies any injuries or trauma to the area.  She states that she will have neck stiffness as well and will make it difficult for her to look to the left and right.  She will also note an increase in numbness and tingling when she puts her hand above her head.    PAST MEDICAL HISTORY:  Past Medical History:   Diagnosis Date    Abnormal Pap smear of cervix 2020    details in separate report    Asymptomatic bacteriuria     Last Assessed: 4/8/2013     Contusion of left chest wall     Last Assessed: 10/20/2015     Elevated ALT measurement     Last Assessed: 4/8/2013    GERD (gastroesophageal reflux disease)     Herpes since childhood    on mouth, lips    Irritable bowel syndrome     Right lateral epicondylitis     Last Assessed: 11/8/2013        PAST SURGICAL HISTORY:  Past Surgical History:   Procedure Laterality Date    BREAST BIOPSY Left 1994    EXCISIONAL BIOPSY BENIGN    BREAST LUMPECTOMY  1994    COLONOSCOPY  04/03/2012    normal mucosa throughout the whole colon, grade 2 internal hemorrhoids, 10 year recall April 2022     NO PAST SURGERIES         FAMILY HISTORY:  Family History   Problem Relation Age of Onset    Fibromyalgia Mother     Leukemia Mother     Arthritis Mother     Diabetes Father     No Known Problems Sister     No Known Problems Sister     Cervical cancer Maternal Grandmother     Cancer Maternal Grandmother         female cancer    No Known Problems Maternal Grandfather     Colon cancer Paternal Grandmother     Cancer Paternal Grandmother     No Known Problems Paternal Grandfather     No Known Problems Brother     Kidney disease Paternal Aunt         Polycystic disease  "throughout famiky    Breast cancer Cousin 40    Breast cancer Cousin 45    Colon polyps Neg Hx        SOCIAL HISTORY:  Social History     Tobacco Use    Smoking status: Former     Current packs/day: 0.00     Average packs/day: 1 pack/day for 5.0 years (5.0 ttl pk-yrs)     Types: Cigarettes     Start date: 1978     Quit date: 1983     Years since quittin.8    Smokeless tobacco: Never    Tobacco comments:     Per [employer]: Former Smoker - remote history.     Vaping Use    Vaping status: Never Used   Substance Use Topics    Alcohol use: Yes     Alcohol/week: 7.0 standard drinks of alcohol     Types: 7 Glasses of wine per week     Comment: social, Occasional Alcohol Use      Drug use: No       MEDICATIONS:    Current Outpatient Medications:     Bacillus Coagulans-Inulin (Probiotic) 1-250 BILLION-MG CAPS, , Disp: , Rfl:     Cholecalciferol (Vitamin D3) 50 MCG ( UT) CHEW, Chew, Disp: , Rfl:     Cyanocobalamin (B-12) 1000 MCG CAPS, , Disp: , Rfl:     L-Tryptophan 500 MG CAPS, , Disp: , Rfl:     Lysine 1000 MG TABS, , Disp: , Rfl:     NON FORMULARY, omega 3 plant based algae oil, Disp: , Rfl:     TURMERIC PO, Take by mouth, Disp: , Rfl:     ALLERGIES:  Allergies   Allergen Reactions    Naproxen      Annotation - 2013: The patient felt disoriented    Sulfa Antibiotics Rash       REVIEW OF SYSTEMS:  Pertinent items are noted in HPI.  A comprehensive review of systems was negative.    LABS:  HgA1c: No results found for: \"HGBA1C\"  BMP:   Lab Results   Component Value Date    GLUCOSE 94 2014    CALCIUM 10.3 2020     2014    K 5.0 2023    CO2 23 2023     2023    BUN 16 2023    CREATININE 0.69 2023       _____________________________________________________  PHYSICAL EXAMINATION:  Vital signs: Ht 5' 9.2\" (1.758 m)   Wt 59 kg (130 lb)   LMP  (LMP Unknown)   BMI 19.09 kg/m²   General: well developed and well nourished, alert, oriented times 3, and " appears comfortable  Psychiatric: Normal  HEENT: Trachea Midline, No torticollis  Cardiovascular: No discernable arrhythmia  Pulmonary: No wheezing or stridor  Abdomen: No rebound or guarding  Extremities: No peripheral edema  Skin: No masses, erythema, lacerations, fluctation, ulcerations  Neurovascular: Sensation intact to the Ulnar Nerve, Sensation Intact to the Radial Nerve, Decreased Sensation to  the Median Nerve, Motor Intact to the Median, Ulnar, Radial Nerve, and Pulses Intact    MUSCULOSKELETAL EXAMINATION:  Right Carpal Tunnel Exam:    Negative thenar atrophy. Negative phalen's test. Positive carpal tunnel compression. Positive tinels over median nerve at the wrist.  APB 4+/5, AIN 5/5, intrinsics 5/5    Strength is 5 out of 5 for wrist flexion and extension   Strength is 5 out of 5 for elbow flexion and extension  Strength is 5 out of 5 for shoulder abduction        _____________________________________________________  STUDIES REVIEWED:  X-rays of the cervical spine were reviewed which demonstrated C6 and C7 generative disc disease as well as C5-C6.  There is also neuroforaminal narrowing at C5-C6, C6-C7 bilaterally      PROCEDURES PERFORMED:  Procedures  No Procedures performed today

## 2024-11-07 ENCOUNTER — HOSPITAL ENCOUNTER (OUTPATIENT)
Dept: NEUROLOGY | Facility: CLINIC | Age: 64
End: 2024-11-07
Payer: COMMERCIAL

## 2024-11-07 DIAGNOSIS — R20.0 NUMBNESS AND TINGLING IN RIGHT HAND: ICD-10-CM

## 2024-11-07 DIAGNOSIS — R20.2 NUMBNESS AND TINGLING IN RIGHT HAND: ICD-10-CM

## 2024-11-07 PROBLEM — G56.03 BILATERAL CARPAL TUNNEL SYNDROME: Status: ACTIVE | Noted: 2024-11-07

## 2024-11-07 PROCEDURE — 95886 MUSC TEST DONE W/N TEST COMP: CPT | Performed by: PSYCHIATRY & NEUROLOGY

## 2024-11-07 PROCEDURE — 95911 NRV CNDJ TEST 9-10 STUDIES: CPT | Performed by: PSYCHIATRY & NEUROLOGY

## 2024-11-11 ENCOUNTER — OFFICE VISIT (OUTPATIENT)
Dept: OBGYN CLINIC | Facility: CLINIC | Age: 64
End: 2024-11-11
Payer: COMMERCIAL

## 2024-11-11 ENCOUNTER — ANNUAL EXAM (OUTPATIENT)
Dept: GYNECOLOGY | Facility: CLINIC | Age: 64
End: 2024-11-11
Payer: COMMERCIAL

## 2024-11-11 VITALS — BODY MASS INDEX: 19.34 KG/M2 | WEIGHT: 130.6 LBS | HEIGHT: 69 IN

## 2024-11-11 VITALS
SYSTOLIC BLOOD PRESSURE: 126 MMHG | WEIGHT: 130 LBS | BODY MASS INDEX: 19.26 KG/M2 | HEIGHT: 69 IN | DIASTOLIC BLOOD PRESSURE: 74 MMHG

## 2024-11-11 DIAGNOSIS — Z12.39 SCREENING FOR BREAST CANCER USING NON-MAMMOGRAM MODALITY: ICD-10-CM

## 2024-11-11 DIAGNOSIS — Z11.51 SCREENING FOR HPV (HUMAN PAPILLOMAVIRUS): ICD-10-CM

## 2024-11-11 DIAGNOSIS — Z78.0 ASYMPTOMATIC MENOPAUSE: ICD-10-CM

## 2024-11-11 DIAGNOSIS — R92.30 DENSE BREAST TISSUE ON MAMMOGRAM, UNSPECIFIED TYPE: ICD-10-CM

## 2024-11-11 DIAGNOSIS — N95.2 VAGINAL ATROPHY: ICD-10-CM

## 2024-11-11 DIAGNOSIS — Z01.419 ENCOUNTER FOR ANNUAL ROUTINE GYNECOLOGICAL EXAMINATION: Primary | ICD-10-CM

## 2024-11-11 DIAGNOSIS — G56.01 CARPAL TUNNEL SYNDROME ON RIGHT: Primary | ICD-10-CM

## 2024-11-11 DIAGNOSIS — Z12.31 ENCOUNTER FOR SCREENING MAMMOGRAM FOR BREAST CANCER: ICD-10-CM

## 2024-11-11 PROCEDURE — 99204 OFFICE O/P NEW MOD 45 MIN: CPT | Performed by: ORTHOPAEDIC SURGERY

## 2024-11-11 PROCEDURE — S0612 ANNUAL GYNECOLOGICAL EXAMINA: HCPCS | Performed by: OBSTETRICS & GYNECOLOGY

## 2024-11-11 NOTE — PROGRESS NOTES
Ambulatory Visit  Name: Nakia Adler      : 1960      MRN: [id number]  Encounter Provider: Yadi Reyna DO  Encounter Date: 2024   Encounter department: [facility] [place]    Assessment & Plan  Encounter for annual routine gynecological examination    Orders:    IGP, Aptima HPV, Rfx 16/18,45    Encounter for screening mammogram for breast cancer    Orders:    Mammo screening bilateral w 3d and cad; Future    Dense breast tissue on mammogram, unspecified type    Orders:    US breast screening bilateral complete (ABUS); Future    Screening for breast cancer using non-mammogram modality    Orders:    US breast screening bilateral complete (ABUS); Future    Asymptomatic menopause    Orders:    DXA bone density spine hip and pelvis; Future    Screening for HPV (human papillomavirus)    Orders:    IGP, Aptima HPV, Rfx 16/18,45    Vaginal atrophy         Pap and HPV done today.  Reviewed guidelines, she will most likely not need another mammogram    mammogram reviewed with her including breast density.  Mammogram and ABUS ordered for January  Discussed self breast exams    colon cancer screening-colonoscopy in 2022, recall in 5 years    Baseline DEXA ordered for after her birthday    Vaginal atrophy-she was given a list of lubricants and moisturizers.  We also discussed vaginal estrogen.    discussed preventive care, regular exercise and a healthy diet    She will most likely be on Medicare next year, discussed that sometimes this exam is only covered every other year depending on her plan.    History of Present Illness     Nakia Adler is a 64 y.o. female who presents for yearly.  She has stress incontinence.  She feels it is unchanged.  She is inconsistent with Kegels.  She does not wear a pad.  She does have vaginal dryness however this is essentially asymptomatic as she is not sexually active although she has contemplated this for the future.      Normal pap, negative  HPV in   Normal 3D mammogram in January with dense tissue and average risk  Normal ABUS also done in January      Review of Systems   Constitutional: Negative.    Gastrointestinal: Negative.    Genitourinary: Negative.            Objective     LMP  (LMP Unknown)     Physical Exam  Vitals and nursing note reviewed. Exam conducted with a chaperone present.   Constitutional:       Appearance: She is well-developed.   HENT:      Head: Normocephalic and atraumatic.   Neck:      Thyroid: No thyromegaly.   Cardiovascular:      Rate and Rhythm: Normal rate and regular rhythm.   Pulmonary:      Effort: Pulmonary effort is normal.      Breath sounds: Normal breath sounds.   Chest:   Breasts:     Right: Normal.      Left: Normal.      Comments: Examined seated and supine  Abdominal:      Palpations: Abdomen is soft.   Genitourinary:     General: Normal vulva.      Vagina: Normal.      Cervix: Normal.      Uterus: Normal.       Adnexa: Right adnexa normal and left adnexa normal.      Rectum: Normal.      Comments: Vagina is atrophic  Musculoskeletal:      Cervical back: Neck supple.   Skin:     General: Skin is warm and dry.   Neurological:      Mental Status: She is alert.   Psychiatric:         Mood and Affect: Mood normal.

## 2024-11-11 NOTE — PROGRESS NOTES
ASSESSMENT/PLAN:    Assessment:   Carpal Tunnel Syndrome right    Plan:   EMG was reviewed at today's visit.  Discussed with patient about splinting, CSI, or right carpal tunnel release.  After discussion, patient will like to proceed with nighttime splinting  for her right wrist for the next the 3-4 weeks.   Discussed activity modifications while doing yoga.   Patient will follow-up in 4-6 weeks for reevaluation.  Discussed with patient if her symptoms do not improve we can consider possible CSI versus right carpal tunnel release.    Follow Up:  4  week(s)    To Do Next Visit:  Reevaluation     General Discussions:     Carpal Tunnel Syndrome: The anatomy and physiology of carpal tunnel syndrome was discussed with the patient today.  Increase pressure localized under the transverse carpal ligament can cause pain, numbness, tingling, or dysesthesias within the median nerve distribution as well as feelings of fatigue, clumsiness, or awkwardness.  These symptoms typically occur at night and worse in the morning upon waking.  Eventually, untreated carpal tunnel syndrome can result in weakness and permanent loss of muscle within the thenar compartment of the hand.  Treatment options were discussed with the patient.  Conservative treatment includes nocturnal resting splints to keep the nerve in a neutral position, ergonomic changes within the work or home environment, activity modification, and tendon gliding exercises.  Steroid injections within the carpal canal can help a majority of patients, however this is often self-limited in a majority of patients.  Surgical intervention to divide the transverse carpal ligament typically results in a long-lasting relief of the patient's complaints, with the recurrence rate of less than 1%.     _____________________________________________________  CHIEF COMPLAINT:  Chief Complaint   Patient presents with    Right Hand - Follow-up         SUBJECTIVE:  Nakia Adler is a 64 y.o.  female who presents for follow up regarding right carpal tunnel syndrome. She states that she continues to have   Numbness and tingling especially when washing her hair and with her arm elevated.  Patient is noticing that it is bothersome at night within her thumb index and long finger.  Patient states she only tried splinting for 3-4 days with no relief.  Patient has not had any CSI or surgical interventions done to her left wrist.  Patient is right-hand dominant.      PAST MEDICAL HISTORY:  Past Medical History:   Diagnosis Date    Abnormal Pap smear of cervix 2020    details in separate report    Asymptomatic bacteriuria     Last Assessed: 4/8/2013     Contusion of left chest wall     Last Assessed: 10/20/2015     Elevated ALT measurement     Last Assessed: 4/8/2013    GERD (gastroesophageal reflux disease)     Herpes since childhood    on mouth, lips    Irritable bowel syndrome     Right lateral epicondylitis     Last Assessed: 11/8/2013        PAST SURGICAL HISTORY:  Past Surgical History:   Procedure Laterality Date    BREAST BIOPSY Left 1994    EXCISIONAL BIOPSY BENIGN    BREAST LUMPECTOMY  1994    COLONOSCOPY  04/03/2012    normal mucosa throughout the whole colon, grade 2 internal hemorrhoids, 10 year recall April 2022     NO PAST SURGERIES         FAMILY HISTORY:  Family History   Problem Relation Age of Onset    Fibromyalgia Mother     Leukemia Mother     Arthritis Mother     Diabetes Father     No Known Problems Sister     No Known Problems Sister     Cervical cancer Maternal Grandmother     Cancer Maternal Grandmother         female cancer    No Known Problems Maternal Grandfather     Colon cancer Paternal Grandmother     Cancer Paternal Grandmother     No Known Problems Paternal Grandfather     No Known Problems Brother     Kidney disease Paternal Aunt         Polycystic disease throughout famy    Breast cancer Cousin 40    Breast cancer Cousin 45    Colon polyps Neg Hx        SOCIAL HISTORY:  Social  "History     Tobacco Use    Smoking status: Former     Current packs/day: 0.00     Average packs/day: 1 pack/day for 5.0 years (5.0 ttl pk-yrs)     Types: Cigarettes     Start date: 1978     Quit date: 1983     Years since quittin.8    Smokeless tobacco: Never    Tobacco comments:     Per [employer]: Former Smoker - remote history.     Vaping Use    Vaping status: Never Used   Substance Use Topics    Alcohol use: Yes     Alcohol/week: 7.0 standard drinks of alcohol     Types: 7 Glasses of wine per week     Comment: social, Occasional Alcohol Use      Drug use: No       MEDICATIONS:    Current Outpatient Medications:     Bacillus Coagulans-Inulin (Probiotic) 1-250 BILLION-MG CAPS, , Disp: , Rfl:     Cholecalciferol (Vitamin D3) 50 MCG (2000) CHEW, Chew, Disp: , Rfl:     Cyanocobalamin (B-12) 1000 MCG CAPS, , Disp: , Rfl:     L-Tryptophan 500 MG CAPS, , Disp: , Rfl:     Lysine 1000 MG TABS, , Disp: , Rfl:     NON FORMULARY, omega 3 plant based algae oil, Disp: , Rfl:     TURMERIC PO, Take by mouth, Disp: , Rfl:     ALLERGIES:  Allergies   Allergen Reactions    Naproxen      Annotation - 2013: The patient felt disoriented    Sulfa Antibiotics Rash       REVIEW OF SYSTEMS:  Pertinent items are noted in HPI.  A comprehensive review of systems was negative.    LABS:  HgA1c: No results found for: \"HGBA1C\"  BMP:   Lab Results   Component Value Date    GLUCOSE 94 2014    CALCIUM 10.3 2020     2014    K 5.0 2023    CO2 23 2023     2023    BUN 16 2023    CREATININE 0.69 2023           _____________________________________________________  PHYSICAL EXAMINATION:  Vital signs: Ht 5' 9.2\" (1.758 m)   Wt 59.2 kg (130 lb 9.6 oz)   LMP  (LMP Unknown)   BMI 19.17 kg/m²   General: well developed and well nourished, alert, oriented times 3, and appears comfortable  Psychiatric: Normal  HEENT: Trachea Midline, No torticollis  Cardiovascular: No discernable " arrhythmia  Pulmonary: No wheezing or stridor  Abdomen: No rebound or guarding  Extremities: No peripheral edema  Skin: No masses, erythema, lacerations, fluctation, ulcerations  Neurovascular: Sensation Intact to the Median, Ulnar, Radial Nerve, Motor Intact to the Median, Ulnar, Radial Nerve, and Pulses Intact    MUSCULOSKELETAL EXAMINATION:  Right wrist:    Sensation was not decreased in the median nerve distribution.  Sensation was not decreased in the ulnar nerve distribution.  There was not APB atrophy.  There was no intrinsic atrophy. Tinel's at the wrist was Positive. Tinel's at the elbow was Negative. Wrist flexion compression was positive.     Negative thenar atrophy. Negative phalen's test. Positive carpal tunnel compression. Positive tinels over median nerve at the wrist.  APB 5/5, AIN 5/5, intrinsics 5/5.   Full wrist range of motion, full elbow flexion and extension.    _____________________________________________________  STUDIES REVIEWED:  Images were reviewed in PACS by Dr. Cook and demonstrate: EMG of bilateral wrist demonstrates moderate carpal tunnel syndrome   Left sensory: 5.58 ms peak, AMP>10   Right sensory: NR  Right Motor: latency 9, AMP 5   Left motor: latency 5.35, AMP 7.7        PROCEDURES PERFORMED:  Procedures  No Procedures performed today      Scribe Attestation      I,:  Jaycob Zelaya am acting as a scribe while in the presence of the attending physician.:       I,:  Mark Cook MD personally performed the services described in this documentation    as scribed in my presence.:

## 2024-11-17 ENCOUNTER — RESULTS FOLLOW-UP (OUTPATIENT)
Dept: GYNECOLOGY | Facility: CLINIC | Age: 64
End: 2024-11-17

## 2025-01-01 LAB
25(OH)D3+25(OH)D2 SERPL-MCNC: 36 NG/ML (ref 30–100)
ALBUMIN SERPL-MCNC: 4.9 G/DL (ref 3.9–4.9)
ALP SERPL-CCNC: 93 IU/L (ref 44–121)
ALT SERPL-CCNC: 28 IU/L (ref 0–32)
APPEARANCE UR: CLEAR
AST SERPL-CCNC: 31 IU/L (ref 0–40)
BASOPHILS # BLD AUTO: 0 X10E3/UL (ref 0–0.2)
BASOPHILS NFR BLD AUTO: 1 %
BILIRUB SERPL-MCNC: 0.4 MG/DL (ref 0–1.2)
BILIRUB UR QL STRIP: NEGATIVE
BUN SERPL-MCNC: 14 MG/DL (ref 8–27)
BUN/CREAT SERPL: 20 (ref 12–28)
CALCIUM SERPL-MCNC: 10.3 MG/DL (ref 8.7–10.3)
CHLORIDE SERPL-SCNC: 102 MMOL/L (ref 96–106)
CHOLEST SERPL-MCNC: 227 MG/DL (ref 100–199)
CHOLEST/HDLC SERPL: 1.5 RATIO (ref 0–4.4)
CO2 SERPL-SCNC: 24 MMOL/L (ref 20–29)
COLOR UR: YELLOW
CREAT SERPL-MCNC: 0.71 MG/DL (ref 0.57–1)
EGFR: 95 ML/MIN/1.73
EOSINOPHIL # BLD AUTO: 0.1 X10E3/UL (ref 0–0.4)
EOSINOPHIL NFR BLD AUTO: 2 %
ERYTHROCYTE [DISTWIDTH] IN BLOOD BY AUTOMATED COUNT: 12.7 % (ref 11.7–15.4)
GLOBULIN SER-MCNC: 2.5 G/DL (ref 1.5–4.5)
GLUCOSE SERPL-MCNC: 88 MG/DL (ref 70–99)
GLUCOSE UR QL: NEGATIVE
HCT VFR BLD AUTO: 43.8 % (ref 34–46.6)
HDLC SERPL-MCNC: 150 MG/DL
HGB BLD-MCNC: 14.1 G/DL (ref 11.1–15.9)
HGB UR QL STRIP: NEGATIVE
IMM GRANULOCYTES # BLD: 0 X10E3/UL (ref 0–0.1)
IMM GRANULOCYTES NFR BLD: 0 %
KETONES UR QL STRIP: NEGATIVE
LDL CALC COMMENT: ABNORMAL
LDLC SERPL CALC-MCNC: 69 MG/DL (ref 0–99)
LDLC SERPL DIRECT ASSAY-MCNC: 58 MG/DL (ref 0–99)
LEUKOCYTE ESTERASE UR QL STRIP: NEGATIVE
LYMPHOCYTES # BLD AUTO: 1.7 X10E3/UL (ref 0.7–3.1)
LYMPHOCYTES NFR BLD AUTO: 34 %
MCH RBC QN AUTO: 33.2 PG (ref 26.6–33)
MCHC RBC AUTO-ENTMCNC: 32.2 G/DL (ref 31.5–35.7)
MCV RBC AUTO: 103 FL (ref 79–97)
MICRO URNS: NORMAL
MONOCYTES # BLD AUTO: 0.6 X10E3/UL (ref 0.1–0.9)
MONOCYTES NFR BLD AUTO: 11 %
NEUTROPHILS # BLD AUTO: 2.7 X10E3/UL (ref 1.4–7)
NEUTROPHILS NFR BLD AUTO: 52 %
NITRITE UR QL STRIP: NEGATIVE
PH UR STRIP: 6 [PH] (ref 5–7.5)
PLATELET # BLD AUTO: 335 X10E3/UL (ref 150–450)
POTASSIUM SERPL-SCNC: 4.6 MMOL/L (ref 3.5–5.2)
PROT SERPL-MCNC: 7.4 G/DL (ref 6–8.5)
PROT UR QL STRIP: NEGATIVE
RBC # BLD AUTO: 4.25 X10E6/UL (ref 3.77–5.28)
SL AMB VLDL CHOLESTEROL CALC: 8 MG/DL (ref 5–40)
SODIUM SERPL-SCNC: 141 MMOL/L (ref 134–144)
SP GR UR: 1.01 (ref 1–1.03)
TRIGL SERPL-MCNC: 45 MG/DL (ref 0–149)
TSH SERPL DL<=0.005 MIU/L-ACNC: 3.01 UIU/ML (ref 0.45–4.5)
UROBILINOGEN UR STRIP-ACNC: 0.2 MG/DL (ref 0.2–1)
WBC # BLD AUTO: 5.2 X10E3/UL (ref 3.4–10.8)

## 2025-01-02 ENCOUNTER — RESULTS FOLLOW-UP (OUTPATIENT)
Dept: FAMILY MEDICINE CLINIC | Facility: CLINIC | Age: 65
End: 2025-01-02

## 2025-01-07 ENCOUNTER — OFFICE VISIT (OUTPATIENT)
Dept: FAMILY MEDICINE CLINIC | Facility: CLINIC | Age: 65
End: 2025-01-07
Payer: COMMERCIAL

## 2025-01-07 VITALS
BODY MASS INDEX: 19.76 KG/M2 | RESPIRATION RATE: 16 BRPM | HEIGHT: 69 IN | TEMPERATURE: 96.4 F | OXYGEN SATURATION: 100 % | DIASTOLIC BLOOD PRESSURE: 60 MMHG | WEIGHT: 133.4 LBS | SYSTOLIC BLOOD PRESSURE: 112 MMHG | HEART RATE: 73 BPM

## 2025-01-07 DIAGNOSIS — R53.82 CHRONIC FATIGUE: Primary | ICD-10-CM

## 2025-01-07 PROCEDURE — 99213 OFFICE O/P EST LOW 20 MIN: CPT

## 2025-01-07 NOTE — PROGRESS NOTES
"Name: Nakia Adler      : 1960      MRN: [id number]  Encounter Provider: ROGER Salgado  Encounter Date: 2025   Encounter department: [facility]  :  Assessment & Plan  Chronic fatigue  Reviewed recent lab work from  - WNL except some elevation in MCV/MCH. Pt to complete additional lab work as ordered today. The patient will be notified of results when available and also any further recommendations. Pt will begin to track symptoms through journaling. May consider a sleep study to assess for sleep apnea. Can also consider tx/discussion r/t possible SAD. Depression score negative today. Follow up as needed or at next regularly scheduled appointment. Declines printed AVS.  Orders:  •  Folate; Future  •  Vitamin B12; Future  •  Fe+TIBC+Ildefonso; Future  •  Mononucleosis screen  •  Lyme Total AB W Reflex to IGM/IGG          Depression Screening and Follow-up Plan: Patient was screened for depression during today's encounter. They screened negative with a PHQ-2 score of 0.      History of Present Illness {?Quick Links Encounters * My Last Note * Last Note in Specialty * Snapshot * Since Last Visit * History :28141}    Nakia Adler is a 64 y.o. year old female who presents today with a concern of increased fatigue. Feels \"blah\". Increased sleeping 10 hours at night and taking naps some days. Increasing over the past few months. She feels \"very low energy\". She also reports occasional headaches and nausea. She is vegetarian. Reports feeling down more lately since the election. Her  passed away 3 years ago in August from ALS. Reports the holidays are always harder.       Fatigue  This is a new problem. The current episode started more than 1 month ago. The problem occurs daily. The problem has been unchanged. Associated symptoms include fatigue, headaches and nausea. Pertinent negatives include no abdominal pain, anorexia, arthralgias, change in bowel habit, chest " "pain, chills, congestion, coughing, diaphoresis, fever, joint swelling, myalgias, neck pain, numbness, rash, sore throat, swollen glands, urinary symptoms, vertigo, visual change, vomiting or weakness. She has tried sleep for the symptoms.     Review of Systems   Constitutional:  Positive for fatigue. Negative for chills, diaphoresis and fever.   HENT: Negative.  Negative for congestion, ear pain, rhinorrhea and sore throat.    Eyes: Negative.  Negative for pain and visual disturbance.   Respiratory: Negative.  Negative for cough and shortness of breath.    Cardiovascular: Negative.  Negative for chest pain, palpitations and leg swelling.   Gastrointestinal:  Positive for nausea. Negative for abdominal pain, anorexia, change in bowel habit, constipation, diarrhea and vomiting.   Endocrine: Negative.    Genitourinary: Negative.  Negative for dysuria, frequency and urgency.   Musculoskeletal: Negative.  Negative for arthralgias, back pain, joint swelling, myalgias and neck pain.   Skin: Negative.  Negative for rash.   Allergic/Immunologic: Negative.    Neurological:  Positive for headaches. Negative for dizziness, vertigo, weakness, light-headedness and numbness.   Hematological: Negative.    Psychiatric/Behavioral: Negative.         Objective {?Quick Links Trend Vitals * Enter New Vitals * Results Review * Timeline (Adult) * Labs * Imaging * Cardiology * Procedures * Lung Cancer Screening * Surgical eConsent :84178}  /60 (BP Location: Left arm, Patient Position: Sitting, Cuff Size: Standard)   Pulse 73   Temp (!) 96.4 °F (35.8 °C) (Tympanic)   Resp 16   Ht 5' 9.2\" (1.758 m)   Wt 60.5 kg (133 lb 6.4 oz)   LMP  (LMP Unknown)   SpO2 100%   BMI 19.59 kg/m²      Physical Exam  Vitals and nursing note reviewed.   Constitutional:       General: She is not in acute distress.     Appearance: Normal appearance. She is not ill-appearing.   HENT:      Head: Normocephalic and atraumatic.      Right Ear: Ear canal " and external ear normal. There is impacted cerumen.      Left Ear: Tympanic membrane, ear canal and external ear normal.      Nose: Nose normal. No congestion.      Mouth/Throat:      Mouth: Mucous membranes are moist.      Pharynx: Oropharynx is clear. No posterior oropharyngeal erythema.   Eyes:      Extraocular Movements: Extraocular movements intact.      Conjunctiva/sclera: Conjunctivae normal.      Pupils: Pupils are equal, round, and reactive to light.   Cardiovascular:      Rate and Rhythm: Normal rate and regular rhythm.      Heart sounds: Normal heart sounds. No murmur heard.  Pulmonary:      Effort: Pulmonary effort is normal. No respiratory distress.      Breath sounds: Normal breath sounds. No wheezing.   Abdominal:      General: Abdomen is flat. Bowel sounds are normal.      Palpations: Abdomen is soft.      Tenderness: There is no abdominal tenderness.   Musculoskeletal:         General: No tenderness. Normal range of motion.      Cervical back: Normal range of motion and neck supple. No tenderness.   Lymphadenopathy:      Cervical: No cervical adenopathy.   Skin:     General: Skin is warm and dry.      Capillary Refill: Capillary refill takes less than 2 seconds.      Findings: No bruising or rash.   Neurological:      General: No focal deficit present.      Mental Status: She is alert and oriented to person, place, and time.   Psychiatric:         Mood and Affect: Mood normal.         Behavior: Behavior normal.

## 2025-01-08 LAB
B BURGDOR AB SER IA.MTTT-IMP: ABNORMAL
B BURGDOR IGG SERPL QL IA: POSITIVE
B BURGDOR IGG+IGM SER QL IA: POSITIVE
B BURGDOR IGM SERPL QL IA: NEGATIVE
FERRITIN SERPL-MCNC: 116 NG/ML (ref 15–150)
FOLATE SERPL-MCNC: >20 NG/ML
HETEROPH AB SER QL LA: NEGATIVE
IRON SATN MFR SERPL: 26 % (ref 15–55)
IRON SERPL-MCNC: 92 UG/DL (ref 27–139)
TIBC SERPL-MCNC: 350 UG/DL (ref 250–450)
UIBC SERPL-MCNC: 258 UG/DL (ref 118–369)
VIT B12 SERPL-MCNC: 985 PG/ML (ref 232–1245)

## 2025-01-10 ENCOUNTER — RESULTS FOLLOW-UP (OUTPATIENT)
Dept: FAMILY MEDICINE CLINIC | Facility: CLINIC | Age: 65
End: 2025-01-10

## 2025-01-27 ENCOUNTER — OFFICE VISIT (OUTPATIENT)
Dept: OBGYN CLINIC | Facility: CLINIC | Age: 65
End: 2025-01-27
Payer: COMMERCIAL

## 2025-01-27 ENCOUNTER — PREP FOR PROCEDURE (OUTPATIENT)
Dept: OBGYN CLINIC | Facility: CLINIC | Age: 65
End: 2025-01-27

## 2025-01-27 VITALS — HEIGHT: 69 IN | WEIGHT: 133 LBS | BODY MASS INDEX: 19.7 KG/M2

## 2025-01-27 DIAGNOSIS — G56.01 CARPAL TUNNEL SYNDROME ON RIGHT: Primary | ICD-10-CM

## 2025-01-27 PROCEDURE — 99214 OFFICE O/P EST MOD 30 MIN: CPT | Performed by: ORTHOPAEDIC SURGERY

## 2025-01-27 RX ORDER — LIDOCAINE HYDROCHLORIDE AND EPINEPHRINE 10; 10 MG/ML; UG/ML
20 INJECTION, SOLUTION INFILTRATION; PERINEURAL ONCE
OUTPATIENT
Start: 2025-01-27 | End: 2025-01-27

## 2025-01-27 NOTE — PROGRESS NOTES
ASSESSMENT/PLAN:    Assessment:   Carpal Tunnel Syndrome right    Plan:   Discussed with patient conservative treatment including night time splinting, CSI versus surgical intervention  Patient elected to proceed with ECTR for right sided carpal tunnel syndrome      Follow Up:  After surgery    To Do Next Visit:  Re-evaluation     General Discussions:     Carpal Tunnel Syndrome: The anatomy and physiology of carpal tunnel syndrome was discussed with the patient today.  Increase pressure localized under the transverse carpal ligament can cause pain, numbness, tingling, or dysesthesias within the median nerve distribution as well as feelings of fatigue, clumsiness, or awkwardness.  These symptoms typically occur at night and worse in the morning upon waking.  Eventually, untreated carpal tunnel syndrome can result in weakness and permanent loss of muscle within the thenar compartment of the hand.  Treatment options were discussed with the patient.  Conservative treatment includes nocturnal resting splints to keep the nerve in a neutral position, ergonomic changes within the work or home environment, activity modification, and tendon gliding exercises.  Steroid injections within the carpal canal can help a majority of patients, however this is often self-limited in a majority of patients.  Surgical intervention to divide the transverse carpal ligament typically results in a long-lasting relief of the patient's complaints, with the recurrence rate of less than 1%.     _____________________________________________________  CHIEF COMPLAINT:  Chief Complaint   Patient presents with   • Right Hand - Follow-up         SUBJECTIVE:  Nakia Adler is a 64 y.o. female who presents for follow up regarding right carpal tunnel syndrome. She states that she continues to have   numbness and tingling in her right thumb, index, and long finger.      Patient has not had any CSI or surgical interventions done to her left wrist.  Patient  is right-hand dominant.      PAST MEDICAL HISTORY:  Past Medical History:   Diagnosis Date   • Abnormal Pap smear of cervix     details in separate report   • Asymptomatic bacteriuria     Last Assessed: 2013    • Contusion of left chest wall     Last Assessed: 10/20/2015    • Elevated ALT measurement     Last Assessed: 2013   • GERD (gastroesophageal reflux disease)    • Herpes since childhood    on mouth, lips   • Irritable bowel syndrome    • Right lateral epicondylitis     Last Assessed: 2013        PAST SURGICAL HISTORY:  Past Surgical History:   Procedure Laterality Date   • BREAST BIOPSY Left     EXCISIONAL BIOPSY BENIGN   • BREAST LUMPECTOMY     • COLONOSCOPY  2012    normal mucosa throughout the whole colon, grade 2 internal hemorrhoids, 10 year recall 2022    • NO PAST SURGERIES         FAMILY HISTORY:  Family History   Problem Relation Age of Onset   • Fibromyalgia Mother    • Leukemia Mother    • Arthritis Mother    • Diabetes Father    • No Known Problems Sister    • No Known Problems Sister    • Cervical cancer Maternal Grandmother    • Cancer Maternal Grandmother         female cancer   • No Known Problems Maternal Grandfather    • Colon cancer Paternal Grandmother    • Cancer Paternal Grandmother    • No Known Problems Paternal Grandfather    • No Known Problems Brother    • Kidney disease Paternal Aunt         Polycystic disease throughout famiky   • Breast cancer Cousin 40   • Breast cancer Cousin 45   • Colon polyps Neg Hx        SOCIAL HISTORY:  Social History     Tobacco Use   • Smoking status: Former     Current packs/day: 0.00     Average packs/day: 1 pack/day for 5.0 years (5.0 ttl pk-yrs)     Types: Cigarettes     Start date: 1978     Quit date: 1983     Years since quittin.1   • Smokeless tobacco: Never   • Tobacco comments:     Per [employer]: Former Smoker - remote history.     Vaping Use   • Vaping status: Never Used   Substance Use Topics  "  • Alcohol use: Yes     Alcohol/week: 7.0 standard drinks of alcohol     Types: 7 Glasses of wine per week     Comment: social, Occasional Alcohol Use     • Drug use: No       MEDICATIONS:    Current Outpatient Medications:   •  Bacillus Coagulans-Inulin (Probiotic) 1-250 BILLION-MG CAPS, , Disp: , Rfl:   •  Cholecalciferol (Vitamin D3) 50 MCG (2000 UT) CHEW, Chew, Disp: , Rfl:   •  Cyanocobalamin (B-12) 1000 MCG CAPS, , Disp: , Rfl:   •  L-Tryptophan 500 MG CAPS, , Disp: , Rfl:   •  Lysine 1000 MG TABS, , Disp: , Rfl:   •  NON FORMULARY, omega 3 plant based algae oil, Disp: , Rfl:   •  TURMERIC PO, Take by mouth, Disp: , Rfl:     ALLERGIES:  Allergies   Allergen Reactions   • Naproxen      Annotation - 08Nov2013: The patient felt disoriented   • Sulfa Antibiotics Rash       REVIEW OF SYSTEMS:  Pertinent items are noted in HPI.  A comprehensive review of systems was negative.    LABS:  HgA1c: No results found for: \"HGBA1C\"  BMP:   Lab Results   Component Value Date    GLUCOSE 94 07/17/2014    CALCIUM 10.3 01/30/2020     07/17/2014    K 4.6 12/31/2024    CO2 24 12/31/2024     12/31/2024    BUN 14 12/31/2024    CREATININE 0.71 12/31/2024           _____________________________________________________  PHYSICAL EXAMINATION:  Vital signs: Ht 5' 9.2\" (1.758 m)   Wt 60.3 kg (133 lb)   LMP  (LMP Unknown)   BMI 19.53 kg/m²   General: well developed and well nourished, alert, oriented times 3, and appears comfortable  Psychiatric: Normal  HEENT: Trachea Midline, No torticollis  Cardiovascular: No discernable arrhythmia  Pulmonary: No wheezing or stridor  Abdomen: No rebound or guarding  Extremities: No peripheral edema  Skin: No masses, erythema, lacerations, fluctation, ulcerations  Neurovascular: Sensation Intact to the Median, Ulnar, Radial Nerve, Motor Intact to the Median, Ulnar, Radial Nerve, and Pulses Intact    MUSCULOSKELETAL EXAMINATION:  Right wrist:    Negative thenar atrophy. Negative phalen's " test. Positive carpal tunnel compression. Positive tinels over median nerve at the wrist.  APB 4/5, AIN 5/5, intrinsics 5/5. No atrophy noted.  Full wrist range of motion, full elbow flexion and extension.    _____________________________________________________  STUDIES REVIEWED:  Images were reviewed in PACS by Dr. Cook and demonstrate: EMG of bilateral wrist demonstrates moderate carpal tunnel syndrome   Left sensory: 5.58 ms peak, AMP>10   Right sensory: NR  Right Motor: latency 9, AMP 5   Left motor: latency 5.35, AMP 7.7        PROCEDURES PERFORMED:  Procedures  No Procedures performed today        Scribe Attestation    I,:  Gladys Yan am acting as a scribe while in the presence of the attending physician.:       I,:  Mark Cook MD personally performed the services described in this documentation    as scribed in my presence.:

## 2025-03-21 ENCOUNTER — TELEPHONE (OUTPATIENT)
Age: 65
End: 2025-03-21

## 2025-03-21 NOTE — TELEPHONE ENCOUNTER
Patient would like to cancel surgery with Dr Cook on April 22, 2025. Patient states she does not wish to reschedule at this time.

## 2025-04-30 ENCOUNTER — OFFICE VISIT (OUTPATIENT)
Dept: FAMILY MEDICINE CLINIC | Facility: CLINIC | Age: 65
End: 2025-04-30
Payer: COMMERCIAL

## 2025-04-30 VITALS
BODY MASS INDEX: 19.82 KG/M2 | DIASTOLIC BLOOD PRESSURE: 70 MMHG | WEIGHT: 133.8 LBS | HEART RATE: 68 BPM | SYSTOLIC BLOOD PRESSURE: 124 MMHG | TEMPERATURE: 97.7 F | RESPIRATION RATE: 16 BRPM | HEIGHT: 69 IN | OXYGEN SATURATION: 97 %

## 2025-04-30 DIAGNOSIS — Z00.00 ANNUAL PHYSICAL EXAM: Primary | ICD-10-CM

## 2025-04-30 DIAGNOSIS — Z13.1 SCREENING FOR DIABETES MELLITUS: ICD-10-CM

## 2025-04-30 DIAGNOSIS — Z13.228 SCREENING FOR ENDOCRINE, METABOLIC AND IMMUNITY DISORDER: ICD-10-CM

## 2025-04-30 DIAGNOSIS — Z13.0 SCREENING FOR ENDOCRINE, METABOLIC AND IMMUNITY DISORDER: ICD-10-CM

## 2025-04-30 DIAGNOSIS — Z23 ENCOUNTER FOR IMMUNIZATION: ICD-10-CM

## 2025-04-30 DIAGNOSIS — Z13.29 SCREENING FOR ENDOCRINE, METABOLIC AND IMMUNITY DISORDER: ICD-10-CM

## 2025-04-30 DIAGNOSIS — Z13.6 SCREENING FOR CARDIOVASCULAR CONDITION: ICD-10-CM

## 2025-04-30 PROCEDURE — 99396 PREV VISIT EST AGE 40-64: CPT | Performed by: FAMILY MEDICINE

## 2025-04-30 PROCEDURE — 90677 PCV20 VACCINE IM: CPT | Performed by: FAMILY MEDICINE

## 2025-04-30 PROCEDURE — 90471 IMMUNIZATION ADMIN: CPT | Performed by: FAMILY MEDICINE

## 2025-04-30 RX ORDER — MAGNESIUM L-LACTATE 84 MG
84 TABLET, EXTENDED RELEASE ORAL DAILY
COMMUNITY

## 2025-04-30 RX ORDER — DIPHENOXYLATE HYDROCHLORIDE AND ATROPINE SULFATE 2.5; .025 MG/1; MG/1
1 TABLET ORAL DAILY
COMMUNITY

## 2025-04-30 RX ORDER — PHENOL 1.4 %
600 AEROSOL, SPRAY (ML) MUCOUS MEMBRANE 2 TIMES DAILY WITH MEALS
COMMUNITY

## 2025-04-30 NOTE — PROGRESS NOTES
Adult Annual Physical  Name: Nakia Adler      : 1960      MRN: [id number]  Encounter Provider: Heather Gama DO  Encounter Date: 2025   Encounter department: [facility]    :  Assessment & Plan  Annual physical exam  The patient had an unremarkable exam in the office today.  She will continue with her healthy diet and exercise.  She will go for the up-to-date lab work as ordered and we will follow-up with the results when available.  We will see her back as scheduled.  Orders:  •  CBC and differential; Future  •  Comprehensive metabolic panel; Future  •  LDL cholesterol, direct; Future  •  Lipid panel; Future  •  TSH, 3rd generation with Free T4 reflex; Future  •  UA (URINE) with reflex to Scope; Future    Encounter for immunization    Orders:  •  Pneumococcal Conjugate Vaccine 20-valent (Pcv20)    Screening for cardiovascular condition    Orders:  •  CBC and differential; Future  •  Comprehensive metabolic panel; Future  •  LDL cholesterol, direct; Future  •  Lipid panel; Future  •  TSH, 3rd generation with Free T4 reflex; Future  •  UA (URINE) with reflex to Scope; Future    Screening for diabetes mellitus    Orders:  •  CBC and differential; Future  •  Comprehensive metabolic panel; Future  •  LDL cholesterol, direct; Future  •  Lipid panel; Future  •  TSH, 3rd generation with Free T4 reflex; Future  •  UA (URINE) with reflex to Scope; Future    Screening for endocrine, metabolic and immunity disorder    Orders:  •  CBC and differential; Future  •  Comprehensive metabolic panel; Future  •  LDL cholesterol, direct; Future  •  Lipid panel; Future  •  TSH, 3rd generation with Free T4 reflex; Future  •  UA (URINE) with reflex to Scope; Future        Preventive Screenings:  - Diabetes Screening: screening up-to-date, risks/benefits discussed and orders placed  - Cholesterol Screening: screening not indicated, has hyperlipidemia, risks/benefits discussed and orders placed    - Hepatitis C screening: screening up-to-date   - HIV screening: screening up-to-date   - Cervical cancer screening: screening up-to-date   - Breast cancer screening: screening up-to-date   - Colon cancer screening: screening up-to-date   - Lung cancer screening: screening not indicated     Immunizations:  - Immunizations due: Prevnar 20  - Immunizations given per orders      Counseling/Anticipatory Guidance:    - Dental health: discussed importance of regular tooth brushing, flossing, and dental visits.   - Diet: discussed recommendations for a healthy/well-balanced diet.   - Injury prevention: discussed safety/seat belts, safety helmets, smoke detectors, carbon monoxide detectors, and smoking near bedding or upholstery.       Depression Screening and Follow-up Plan: Patient was screened for depression during today's encounter. They screened negative with a PHQ-2 score of 0.          History of Present Illness     Adult Annual Physical:  Patient presents for annual physical. Nakia Adler is a 64 y.o. female who presents today for an annual physical.  She had left carpal tunnel syndrome- seeing ortho.      She had pelvic PT - too expensive  She does [employer].   The patient denies any chest pain, shortness of breath, or palpitations.  There is no JAMES, PND, or orthopnea.  There is no edema.  There are no headaches or visual changes.  There is no lightheadedness, dizziness, or fainting spells.  The patient currently denies any nausea, vomiting, or GERD symptoms.  she has normal bowel movements and normal urine output.  she has a normal appetite.  She sees the Dermatologist yearly.    .     Diet and Physical Activity:  - Diet/Nutrition: consuming 3-5 servings of fruits/vegetables daily and well balanced diet. vegan plus eggs  - Exercise: walking and 5-7 times a week on average. , hiking    Depression Screening:  - PHQ-2 Score: 0    General Health:  - Sleep: sleeps well and 7-8 hours of sleep on average.  - Hearing:  "decreased hearing bilateral ears.  - Vision: wears contacts. mono contacts  - Dental: regular dental visits, brushes teeth twice daily and floss regularly.    /GYN Health:  - Follows with GYN: yes.   - Menopause: postmenopausal.   - History of STDs: no    Advanced Care Planning:  - Has an advanced directive?: yes    - Has a durable medical POA?: yes    - ACP document given to patient?: no      Review of Systems   Constitutional: Negative.    HENT: Negative.     Eyes: Negative.    Respiratory: Negative.     Cardiovascular: Negative.    Gastrointestinal: Negative.    Endocrine: Negative.    Genitourinary: Negative.    Musculoskeletal: Negative.    Skin: Negative.    Allergic/Immunologic: Negative.    Neurological: Negative.    Hematological: Negative.    Psychiatric/Behavioral: Negative.           Objective   /70   Pulse 68   Temp 97.7 °F (36.5 °C) (Tympanic)   Resp 16   Ht 5' 9.2\" (1.758 m)   Wt 60.7 kg (133 lb 12.8 oz)   LMP  (LMP Unknown)   SpO2 97%   BMI 19.64 kg/m²     Physical Exam  Constitutional:       General: She is not in acute distress.     Appearance: Normal appearance. She is well-developed. She is not diaphoretic.   HENT:      Head: Normocephalic and atraumatic.      Right Ear: Tympanic membrane, ear canal and external ear normal.      Left Ear: Tympanic membrane, ear canal and external ear normal.      Nose: Nose normal.      Mouth/Throat:      Mouth: Mucous membranes are moist.      Pharynx: Oropharynx is clear. No oropharyngeal exudate.   Eyes:      General: No scleral icterus.        Right eye: No discharge.         Left eye: No discharge.      Extraocular Movements: Extraocular movements intact.      Pupils: Pupils are equal, round, and reactive to light.   Neck:      Thyroid: No thyromegaly.      Vascular: No JVD.      Trachea: No tracheal deviation.   Cardiovascular:      Rate and Rhythm: Normal rate and regular rhythm.      Heart sounds: Normal heart sounds. No murmur heard.     " No friction rub. No gallop.   Pulmonary:      Effort: Pulmonary effort is normal. No respiratory distress.      Breath sounds: Normal breath sounds. No wheezing or rales.   Chest:      Chest wall: No tenderness.   Abdominal:      General: Bowel sounds are normal. There is no distension.      Palpations: Abdomen is soft. There is no mass.      Tenderness: There is no abdominal tenderness. There is no guarding or rebound.      Hernia: No hernia is present.   Musculoskeletal:         General: No tenderness or deformity. Normal range of motion.      Cervical back: Normal range of motion and neck supple.   Lymphadenopathy:      Cervical: No cervical adenopathy.   Skin:     General: Skin is warm and dry.      Coloration: Skin is not pale.      Findings: No erythema or rash.   Neurological:      Mental Status: She is alert and oriented to person, place, and time.      Cranial Nerves: No cranial nerve deficit.      Sensory: No sensory deficit.      Motor: No abnormal muscle tone.      Coordination: Coordination normal.      Deep Tendon Reflexes: Reflexes normal.   Psychiatric:         Mood and Affect: Mood normal.         Behavior: Behavior normal.         Thought Content: Thought content normal.         Judgment: Judgment normal.         Answers submitted by the patient for this visit:  AUDIT-C (Alcohol Use Disorders Identification Test) Screening (Submitted on 4/30/2025)  How often during the last year have you found that you were not able to stop drinking once you had started?: 0 - never  How often during the last year have you failed to do what was normally expected from you because of drinking?: 0 - never  How often during the last year have you needed a first drink in the morning to get yourself going after a heavy drinking session?: 0 - never  How often during the last year have you had a feeling of guilt or remorse after drinking?: 0 - never  How often during the last year have you been unable to remember what  happened the night before because you had been drinking?: 0 - never  Have you or someone else been injured as a result of your drinking?: 0 - no  Has a relative or friend or a doctor or another health worker been concerned about your drinking or suggested you cut down?: 0 - no  Medicare Annual Wellness Visit (Submitted on 4/30/2025)  How would you rate your overall health?: excellent  Compared to last year, how is your physical health?: slightly better  In general, how satisfied are you with your life?: very satisfied  Compared to last year, how is your eyesight?: same  Compared to last year, how is your hearing?: same  Compared to last year, how is your emotional/mental health?: slightly better  How often is anger a problem for you?: never, rarely  How often do you feel unusually tired/fatigued?: never, rarely  In the past 7 days, how much pain have you experienced?: none  In the past 6 months, have you lost or gained 10 pounds without trying?: No  One or more falls in the last year: No  In the past 6 months, have you accidentally leaked urine?: Yes  Additional Comments: trying to remember to do kegel exercises!  Do you have trouble with the stairs inside or outside your home?: No  Does your home have working smoke alarms?: Yes  Does your home have a carbon monoxide monitor?: Yes  Which safety hazards (if any) have you experienced in your home? Please select all that apply.: none  How would you describe your current diet? Please select all that apply.: Low Carb  Additional Comments: Vegan plus eggs, monitor protein to be sure get enough  In addition to prescription medications, are you taking any over-the-counter supplements?: Yes  If yes, what supplements are you taking?: Listed in earlier page  Can you manage your medications?: Yes  Are you currently taking any opioid medications?: No  Can you walk and transfer into and out of your bed and chair?: Yes  Can you dress and groom yourself?: Yes  Can you bathe or  shower yourself?: Yes  Can you feed yourself?: Yes  Can you do your laundry/ housekeeping?: Yes  Can you manage your money, pay your bills, and track your expenses?: Yes  Can you make your own meals?: Yes  Can you do your own shopping?: Yes  Within the last 12 months, have you had any hospitalizations or Emergency Department visits?: No  Do you have a living will?: Yes  Do you have a Durable POA (Power of ) for healthcare decisions?: Yes  Do you have an Advanced Directive for end of life decisions?: Yes  How often have you used an illegal drug (including marijuana) or a prescription medication for non-medical reasons in the past year?: never  What is the typical number of drinks you consume in a day?: 1  What is the typical number of drinks you consume in a week?: 5  How often did you have a drink containing alcohol in the past year?: 4 or more times a week  How many drinks did you have on a typical day  when you were drinking in the past year?: 1 to 2  How often did you have 6 or more drinks on one occasion in the past year?: never

## 2025-04-30 NOTE — PATIENT INSTRUCTIONS
"Patient Education     Routine physical for adults   The Basics   Written by the doctors and editors at [facility]   What is a physical? -- A physical is a routine visit, or \"check-up,\" with your doctor. You might also hear it called a \"wellness visit\" or \"preventive visit.\"  During each visit, the doctor will:   Ask about your physical and mental health   Ask about your habits, behaviors, and lifestyle   Do an exam   Give you vaccines if needed   Talk to you about any medicines you take   Give advice about your health   Answer your questions  Getting regular check-ups is an important part of taking care of your health. It can help your doctor find and treat any problems you have. But it's also important for preventing health problems.  A routine physical is different from a \"sick visit.\" A sick visit is when you see a doctor because of a health concern or problem. Since physicals are scheduled ahead of time, you can think about what you want to ask the doctor.  How often should I get a physical? -- It depends on your age and health. In general, for people age 21 years and older:   If you are younger than 50 years, you might be able to get a physical every 3 years.   If you are 50 years or older, your doctor might recommend a physical every year.  If you have an ongoing health condition, like diabetes or high blood pressure, your doctor will probably want to see you more often.  What happens during a physical? -- In general, each visit will include:   Physical exam - The doctor or nurse will check your height, weight, heart rate, and blood pressure. They will also look at your eyes and ears. They will ask about how you are feeling and whether you have any symptoms that bother you.   Medicines - It's a good idea to bring a list of all the medicines you take to each doctor visit. Your doctor will talk to you about your medicines and answer any questions. Tell them if you are having any side effects that bother you. You " "should also tell them if you are having trouble paying for any of your medicines.   Habits and behaviors - This includes:   Your diet   Your exercise habits   Whether you smoke, drink alcohol, or use drugs   Whether you are sexually active   Whether you feel safe at home  Your doctor will talk to you about things you can do to improve your health and lower your risk of health problems. They will also offer help and support. For example, if you want to quit smoking, they can give you advice and might prescribe medicines. If you want to improve your diet or get more physical activity, they can help you with this, too.   Lab tests, if needed - The tests you get will depend on your age and situation. For example, your doctor might want to check your:   Cholesterol   Blood sugar   Iron level   Vaccines - The recommended vaccines will depend on your age, health, and what vaccines you already had. Vaccines are very important because they can prevent certain serious or deadly infections.   Discussion of screening - \"Screening\" means checking for diseases or other health problems before they cause symptoms. Your doctor can recommend screening based on your age, risk, and preferences. This might include tests to check for:   Cancer, such as breast, prostate, cervical, ovarian, colorectal, prostate, lung, or skin cancer   Sexually transmitted infections, such as chlamydia and gonorrhea   Mental health conditions like depression and anxiety  Your doctor will talk to you about the different types of screening tests. They can help you decide which screenings to have. They can also explain what the results might mean.   Answering questions - The physical is a good time to ask the doctor or nurse questions about your health. If needed, they can refer you to other doctors or specialists, too.  Adults older than 65 years often need other care, too. As you get older, your doctor will talk to you about:   How to prevent falling at " home   Hearing or vision tests   Memory testing   How to take your medicines safely   Making sure that you have the help and support you need at home  All topics are updated as new evidence becomes available and our peer review process is complete.  This topic retrieved from [employer] on: May 02, 2024.  Topic 254873 Version 1.0  Release: 32.4.3 - C32.122  © 2024 Up[employer], [employer] and/or its affiliates. All rights reserved.  Consumer Information Use and Disclaimer   Disclaimer: This generalized information is a limited summary of diagnosis, treatment, and/or medication information. It is not meant to be comprehensive and should be used as a tool to help the user understand and/or assess potential diagnostic and treatment options. It does NOT include all information about conditions, treatments, medications, side effects, or risks that may apply to a specific patient. It is not intended to be medical advice or a substitute for the medical advice, diagnosis, or treatment of a health care provider based on the health care provider's examination and assessment of a patient's specific and unique circumstances. Patients must speak with a health care provider for complete information about their health, medical questions, and treatment options, including any risks or benefits regarding use of medications. This information does not endorse any treatments or medications as safe, effective, or approved for treating a specific patient. Up[employer], Inc[employer] and its affiliates disclaim any warranty or liability relating to this information or the use thereof.The use of this information is governed by the Terms of Use, available at https://www.woltersFrazruwer.com/en/know/clinical-effectiveness-terms. 2024© [employer]Date, Inc. and its affiliates and/or licensors. All rights reserved.  Copyright   © 2024 [employer], [employer] and/or its affiliates. All rights reserved.

## 2025-07-15 ENCOUNTER — HOSPITAL ENCOUNTER (OUTPATIENT)
Dept: MAMMOGRAPHY | Facility: CLINIC | Age: 65
Discharge: HOME | End: 2025-07-15
Payer: MEDICARE

## 2025-07-15 VITALS — HEIGHT: 69 IN | WEIGHT: 133 LBS | BODY MASS INDEX: 19.7 KG/M2

## 2025-07-15 DIAGNOSIS — Z12.31 ENCOUNTER FOR SCREENING MAMMOGRAM FOR BREAST CANCER: ICD-10-CM

## 2025-07-15 PROCEDURE — 77063 BREAST TOMOSYNTHESIS BI: CPT

## 2025-07-15 PROCEDURE — 77067 SCR MAMMO BI INCL CAD: CPT

## 2025-07-17 ENCOUNTER — HOSPITAL ENCOUNTER (OUTPATIENT)
Dept: BONE DENSITY | Facility: IMAGING CENTER | Age: 65
Discharge: HOME | End: 2025-07-17
Payer: MEDICARE

## 2025-07-17 VITALS — HEIGHT: 68 IN | BODY MASS INDEX: 20 KG/M2 | WEIGHT: 132 LBS

## 2025-07-17 DIAGNOSIS — Z78.0 ASYMPTOMATIC MENOPAUSE: ICD-10-CM

## 2025-07-17 PROCEDURE — 77080 DXA BONE DENSITY AXIAL: CPT

## 2025-08-18 ENCOUNTER — OFFICE VISIT (OUTPATIENT)
Dept: OBGYN CLINIC | Facility: CLINIC | Age: 65
End: 2025-08-18
Payer: MEDICARE

## 2025-08-18 VITALS — BODY MASS INDEX: 19.55 KG/M2 | HEIGHT: 68 IN | WEIGHT: 129 LBS

## 2025-08-18 DIAGNOSIS — G56.01 CARPAL TUNNEL SYNDROME ON RIGHT: Primary | ICD-10-CM

## 2025-08-18 PROCEDURE — 99213 OFFICE O/P EST LOW 20 MIN: CPT | Performed by: ORTHOPAEDIC SURGERY
